# Patient Record
Sex: FEMALE | Race: WHITE | NOT HISPANIC OR LATINO | Employment: FULL TIME | URBAN - METROPOLITAN AREA
[De-identification: names, ages, dates, MRNs, and addresses within clinical notes are randomized per-mention and may not be internally consistent; named-entity substitution may affect disease eponyms.]

---

## 2018-08-09 DIAGNOSIS — K21.9 GASTROESOPHAGEAL REFLUX DISEASE WITHOUT ESOPHAGITIS: Primary | ICD-10-CM

## 2018-08-09 RX ORDER — OMEPRAZOLE 40 MG/1
CAPSULE, DELAYED RELEASE ORAL
Qty: 30 CAPSULE | Refills: 3 | Status: SHIPPED | OUTPATIENT
Start: 2018-08-09 | End: 2018-12-10

## 2018-08-09 RX ORDER — OMEPRAZOLE 40 MG/1
CAPSULE, DELAYED RELEASE ORAL
COMMUNITY
Start: 2018-04-18 | End: 2018-08-09 | Stop reason: SDUPTHER

## 2018-10-19 DIAGNOSIS — J30.89 NON-SEASONAL ALLERGIC RHINITIS, UNSPECIFIED TRIGGER: Primary | ICD-10-CM

## 2018-10-19 RX ORDER — FLUTICASONE PROPIONATE 50 MCG
SPRAY, SUSPENSION (ML) NASAL
Qty: 1 BOTTLE | Refills: 3 | Status: SHIPPED | OUTPATIENT
Start: 2018-10-19 | End: 2018-12-10

## 2018-12-10 ENCOUNTER — OFFICE VISIT (OUTPATIENT)
Dept: FAMILY MEDICINE CLINIC | Facility: CLINIC | Age: 28
End: 2018-12-10
Payer: COMMERCIAL

## 2018-12-10 VITALS
WEIGHT: 102 LBS | SYSTOLIC BLOOD PRESSURE: 140 MMHG | BODY MASS INDEX: 21.41 KG/M2 | HEIGHT: 58 IN | RESPIRATION RATE: 16 BRPM | DIASTOLIC BLOOD PRESSURE: 80 MMHG | HEART RATE: 117 BPM | OXYGEN SATURATION: 97 % | TEMPERATURE: 97.9 F

## 2018-12-10 DIAGNOSIS — R07.2 PRECORDIAL PAIN: ICD-10-CM

## 2018-12-10 DIAGNOSIS — K21.9 GERD WITHOUT ESOPHAGITIS: Primary | ICD-10-CM

## 2018-12-10 PROCEDURE — 3008F BODY MASS INDEX DOCD: CPT | Performed by: FAMILY MEDICINE

## 2018-12-10 PROCEDURE — 99214 OFFICE O/P EST MOD 30 MIN: CPT | Performed by: FAMILY MEDICINE

## 2018-12-10 PROCEDURE — 1036F TOBACCO NON-USER: CPT | Performed by: FAMILY MEDICINE

## 2018-12-10 RX ORDER — FAMOTIDINE 40 MG/5ML
20 POWDER, FOR SUSPENSION ORAL 2 TIMES DAILY
Qty: 50 ML | Refills: 0 | Status: SHIPPED | OUTPATIENT
Start: 2018-12-10 | End: 2018-12-19 | Stop reason: SDUPTHER

## 2018-12-10 RX ORDER — LORAZEPAM 0.5 MG/1
TABLET ORAL EVERY 24 HOURS
COMMUNITY
Start: 2018-01-05 | End: 2019-01-21 | Stop reason: SDUPTHER

## 2018-12-10 RX ORDER — NORETHINDRONE AND ETHINYL ESTRADIOL AND FERROUS FUMARATE 0.4-35(21)
1 KIT ORAL
COMMUNITY
Start: 2018-02-19

## 2018-12-10 RX ORDER — TRIAMCINOLONE ACETONIDE 5 MG/G
CREAM TOPICAL EVERY 12 HOURS
COMMUNITY
Start: 2018-01-05 | End: 2018-12-10

## 2018-12-10 RX ORDER — DICYCLOMINE HYDROCHLORIDE 10 MG/1
CAPSULE ORAL EVERY 12 HOURS
COMMUNITY
Start: 2018-05-09 | End: 2018-12-10

## 2018-12-10 NOTE — PROGRESS NOTES
Assessment/Plan:      Diagnoses and all orders for this visit:    GERD without esophagitis  Comments:  seems GERd , but its hurting too much, will do Pepcid for 2 weeks not better than GI specialist   Orders:  -     Ambulatory referral to Gastroenterology; Future  -     famotidine (PEPCID) 40 mg/5 mL suspension; Take 2 5 mL (20 mg total) by mouth 2 (two) times a day    Precordial pain  Comments:  she is consistent witht he precordial pain, will do EST to r/o any cardiac pathology    Other orders  -     Discontinue: dicyclomine (BENTYL) 10 mg capsule; Every 12 hours  -     LORazepam (ATIVAN) 0 5 mg tablet; every 24 hours  -     Discontinue: triamcinolone (KENALOG) 0 5 % cream; Every 12 hours  -     Norethin-Eth Estradiol-Fe 0 4-35 MG-MCG CHEW; Chew 1 tablet          Subjective:     Patient ID: Janie Oconnell is a 25 y o  female  HPI   Since few weeks has a pain in the chest area, persistent , non radiating, increase with the breathing, she is not taking her GERD medication because of its size, Also worried about the chest pain  Review of Systems   Constitutional: Negative  HENT: Negative  Eyes: Negative  Respiratory: Negative  Cardiovascular: Negative  Gastrointestinal: Negative  Genitourinary: Negative  Psychiatric/Behavioral: Negative  Objective:     Physical Exam   Constitutional: She is oriented to person, place, and time  She appears well-developed  HENT:   Head: Normocephalic  Mouth/Throat: Oropharynx is clear and moist    Neck: Normal range of motion  Cardiovascular: Normal rate and regular rhythm  Pulmonary/Chest: Effort normal and breath sounds normal    Abdominal: Soft  Bowel sounds are normal    Neurological: She is alert and oriented to person, place, and time  Skin: Skin is warm  Psychiatric: She has a normal mood and affect

## 2018-12-12 ENCOUNTER — TELEPHONE (OUTPATIENT)
Dept: FAMILY MEDICINE CLINIC | Facility: CLINIC | Age: 28
End: 2018-12-12

## 2018-12-12 NOTE — TELEPHONE ENCOUNTER
Pt scheduled for exercise stress test 12/24 at 8:00am at [facility]   She was given the directions (prepping) and also scheduling number in case she wants to reschedule

## 2018-12-19 ENCOUNTER — TELEPHONE (OUTPATIENT)
Dept: FAMILY MEDICINE CLINIC | Facility: CLINIC | Age: 28
End: 2018-12-19

## 2018-12-19 DIAGNOSIS — K21.9 GERD WITHOUT ESOPHAGITIS: ICD-10-CM

## 2018-12-19 RX ORDER — FAMOTIDINE 40 MG/5ML
20 POWDER, FOR SUSPENSION ORAL 2 TIMES DAILY
Qty: 50 ML | Refills: 0 | Status: SHIPPED | OUTPATIENT
Start: 2018-12-19 | End: 2019-01-07 | Stop reason: SDUPTHER

## 2018-12-19 NOTE — TELEPHONE ENCOUNTER
Pc from pt needs refill Pepcid liquid 40mg 2 5 ml 2 times day #30 days [facility]  Pt is out of med

## 2019-01-07 ENCOUNTER — HOSPITAL ENCOUNTER (OUTPATIENT)
Dept: NON INVASIVE DIAGNOSTICS | Facility: HOSPITAL | Age: 29
Discharge: HOME | End: 2019-01-07
Payer: COMMERCIAL

## 2019-01-07 ENCOUNTER — TELEPHONE (OUTPATIENT)
Dept: FAMILY MEDICINE CLINIC | Facility: CLINIC | Age: 29
End: 2019-01-07

## 2019-01-07 DIAGNOSIS — K21.9 GERD WITHOUT ESOPHAGITIS: ICD-10-CM

## 2019-01-07 DIAGNOSIS — R07.2 PRECORDIAL PAIN: ICD-10-CM

## 2019-01-07 LAB
CHEST PAIN STATEMENT: NORMAL
MAX DIASTOLIC BP: 78 MMHG
MAX HEART RATE: 179 BPM
MAX PREDICTED HEART RATE: 190 BPM
MAX. SYSTOLIC BP: 150 MMHG
PROTOCOL NAME: NORMAL
REASON FOR TERMINATION: NORMAL
TARGET HR FORMULA: NORMAL
TEST INDICATION: NORMAL
TIME IN EXERCISE PHASE: NORMAL

## 2019-01-07 PROCEDURE — 93016 CV STRESS TEST SUPVJ ONLY: CPT | Performed by: INTERNAL MEDICINE

## 2019-01-07 PROCEDURE — 93017 CV STRESS TEST TRACING ONLY: CPT

## 2019-01-07 PROCEDURE — 93018 CV STRESS TEST I&R ONLY: CPT | Performed by: INTERNAL MEDICINE

## 2019-01-07 RX ORDER — FAMOTIDINE 40 MG/5ML
20 POWDER, FOR SUSPENSION ORAL 2 TIMES DAILY
Qty: 50 ML | Refills: 1 | Status: SHIPPED | OUTPATIENT
Start: 2019-01-07 | End: 2019-01-21 | Stop reason: SDUPTHER

## 2019-01-07 NOTE — TELEPHONE ENCOUNTER
Pc from pt needs refill Pepcid 40mg/5ml Suspension 2 times a day [facility]  Pt is asking if she can have more than a 10 day supply

## 2019-01-18 ENCOUNTER — TELEPHONE (OUTPATIENT)
Dept: FAMILY MEDICINE CLINIC | Facility: CLINIC | Age: 29
End: 2019-01-18

## 2019-01-21 ENCOUNTER — OFFICE VISIT (OUTPATIENT)
Dept: FAMILY MEDICINE CLINIC | Facility: CLINIC | Age: 29
End: 2019-01-21
Payer: COMMERCIAL

## 2019-01-21 VITALS
WEIGHT: 100.8 LBS | HEART RATE: 129 BPM | HEIGHT: 59 IN | RESPIRATION RATE: 16 BRPM | TEMPERATURE: 98.3 F | DIASTOLIC BLOOD PRESSURE: 80 MMHG | BODY MASS INDEX: 20.32 KG/M2 | SYSTOLIC BLOOD PRESSURE: 120 MMHG | OXYGEN SATURATION: 98 %

## 2019-01-21 DIAGNOSIS — Z23 FLU VACCINE NEED: Primary | ICD-10-CM

## 2019-01-21 DIAGNOSIS — F41.9 ANXIETY: ICD-10-CM

## 2019-01-21 DIAGNOSIS — Z00.00 WELL ADULT EXAM: ICD-10-CM

## 2019-01-21 DIAGNOSIS — G93.0 BRAIN CYST: ICD-10-CM

## 2019-01-21 DIAGNOSIS — K21.9 GERD WITHOUT ESOPHAGITIS: ICD-10-CM

## 2019-01-21 PROCEDURE — 99395 PREV VISIT EST AGE 18-39: CPT | Performed by: NURSE PRACTITIONER

## 2019-01-21 RX ORDER — FAMOTIDINE 40 MG/5ML
20 POWDER, FOR SUSPENSION ORAL 2 TIMES DAILY
Qty: 150 ML | Refills: 3 | Status: SHIPPED | OUTPATIENT
Start: 2019-01-21 | End: 2019-05-18 | Stop reason: SDUPTHER

## 2019-01-21 RX ORDER — LORAZEPAM 0.5 MG/1
0.5 TABLET ORAL EVERY 24 HOURS
Qty: 25 TABLET | Refills: 0 | Status: SHIPPED | OUTPATIENT
Start: 2019-01-21 | End: 2020-05-14 | Stop reason: SDUPTHER

## 2019-01-21 NOTE — ASSESSMENT & PLAN NOTE
Healthy female  She has right sinus congestion without signs of a URI  Refused a flu shot  Does see the dentist and eye doctor  Discussed safety at home and at work, diet and exercise, fire arms and fire detectors  Proper body mechanics at work to prevent injury  F/U as needed

## 2019-01-21 NOTE — PROGRESS NOTES
Assessment/Plan:    Well adult exam  Healthy female  She has right sinus congestion without signs of a URI  Refused a flu shot  Does see the dentist and eye doctor  Discussed safety at home and at work, diet and exercise, fire arms and fire detectors  Proper body mechanics at work to prevent injury  F/U as needed  Brain cyst  Past CT recommended repeat CT  Will order brain CT without contrast          Diagnoses and all orders for this visit:    Flu vaccine need  Comments:  Refused    Orders:  -     SYRINGE/SINGLE-DOSE VIAL: influenza vaccine, 8060-7858, quadrivalent, 0 5 mL, preservative-free (AFLURIA, FLUARIX, FLULAVAL, FLUZONE)    Well adult exam    Brain cyst  -     CT head wo contrast; Future    GERD without esophagitis  Comments:  Pepcid is effective for symptom relief  Will con't with medication  Discussed low acid, no spicy diet and elevated the head of the bed  To return if it worsens  Orders:  -     famotidine (PEPCID) 40 mg/5 mL suspension; Take 2 5 mL (20 mg total) by mouth 2 (two) times a day    Anxiety  -     LORazepam (ATIVAN) 0 5 mg tablet; Take 1 tablet (0 5 mg total) by mouth every 24 hours          Subjective:      Patient ID: Carlita Taylor is a 25 y o  female here for annual physical     HPI  Not having any issue but would like a CT of the brain to f/u on a cyst   The following portions of the patient's history were reviewed and updated as appropriate:   She  has a past medical history of Anxiety; GERD (gastroesophageal reflux disease); and IUD (intrauterine device) in place  She   Patient Active Problem List    Diagnosis Date Noted    Well adult exam 01/21/2019    Brain cyst 01/21/2019    Flu vaccine need 01/21/2019    Anxiety 01/21/2019     She  has no past surgical history on file  Her family history includes Diabetes in her father, paternal grandfather, and paternal grandmother; Hypertension in her mother; Rectal cancer in her paternal grandmother    She  reports that she has never smoked  She has never used smokeless tobacco  She reports that she drinks alcohol  She reports that she does not use drugs  Current Outpatient Prescriptions   Medication Sig Dispense Refill    famotidine (PEPCID) 40 mg/5 mL suspension Take 2 5 mL (20 mg total) by mouth 2 (two) times a day 150 mL 3    LORazepam (ATIVAN) 0 5 mg tablet Take 1 tablet (0 5 mg total) by mouth every 24 hours 25 tablet 0    Norethin-Eth Estradiol-Fe 0 4-35 MG-MCG CHEW Chew 1 tablet       No current facility-administered medications for this visit  Current Outpatient Prescriptions on File Prior to Visit   Medication Sig    Norethin-Eth Estradiol-Fe 0 4-35 MG-MCG CHEW Chew 1 tablet    [DISCONTINUED] famotidine (PEPCID) 40 mg/5 mL suspension Take 2 5 mL (20 mg total) by mouth 2 (two) times a day    [DISCONTINUED] LORazepam (ATIVAN) 0 5 mg tablet every 24 hours     No current facility-administered medications on file prior to visit  She is allergic to albumen, egg and prednisone       Review of Systems   Constitutional: Negative  HENT: Positive for congestion, postnasal drip and sinus pressure  Eyes: Negative  Respiratory: Negative  Cardiovascular: Negative  Gastrointestinal: Negative  Endocrine: Negative  Genitourinary: Negative  Musculoskeletal: Negative  Skin: Negative  Allergic/Immunologic: Positive for food allergies  Neurological: Positive for headaches (with her periods)  Hematological: Negative  Psychiatric/Behavioral: The patient is nervous/anxious (Take lozapam once "in a blue")  Objective:      /80   Pulse (!) 129   Temp 98 3 °F (36 8 °C) (Tympanic)   Resp 16   Ht 4' 11" (1 499 m)   Wt 45 7 kg (100 lb 12 8 oz)   SpO2 98%   BMI 20 36 kg/m²          Physical Exam   Constitutional: She is oriented to person, place, and time  She appears well-developed and well-nourished  HENT:   Head: Normocephalic     Right Ear: External ear normal    Left Ear: External ear normal    Nose: Nose normal    Mouth/Throat: Oropharynx is clear and moist    Eyes: Pupils are equal, round, and reactive to light  Conjunctivae and EOM are normal    Neck: Normal range of motion  Neck supple  No thyromegaly present  Cardiovascular: Normal rate, regular rhythm, normal heart sounds and intact distal pulses  Pulmonary/Chest: Effort normal and breath sounds normal    Abdominal: Soft  Bowel sounds are normal    Musculoskeletal: Normal range of motion  She exhibits tenderness (right lateral thoracic area  Muscular tenderness  )  Lymphadenopathy:     She has no cervical adenopathy  Neurological: She is alert and oriented to person, place, and time  She has normal reflexes  Skin: Skin is warm and dry  Psychiatric: She has a normal mood and affect   Her behavior is normal

## 2019-02-28 ENCOUNTER — TELEPHONE (OUTPATIENT)
Dept: FAMILY MEDICINE CLINIC | Facility: CLINIC | Age: 29
End: 2019-02-28

## 2019-02-28 NOTE — TELEPHONE ENCOUNTER
Per Chris Laughlin at [facility] prior auth dept, CT head w/o contrast approved   auth#[id number] valid 03/04/2019-04/02/2019

## 2019-03-18 ENCOUNTER — HOSPITAL ENCOUNTER (OUTPATIENT)
Dept: CT IMAGING | Facility: HOSPITAL | Age: 29
Discharge: HOME | End: 2019-03-18
Payer: COMMERCIAL

## 2019-03-18 DIAGNOSIS — G93.0 BRAIN CYST: ICD-10-CM

## 2019-03-18 PROCEDURE — 70450 CT HEAD/BRAIN W/O DYE: CPT

## 2019-03-26 ENCOUNTER — TELEPHONE (OUTPATIENT)
Dept: FAMILY MEDICINE CLINIC | Facility: CLINIC | Age: 29
End: 2019-03-26

## 2019-03-26 NOTE — TELEPHONE ENCOUNTER
Pt informed normal CT  She states years ago she had a cyst on her brain  She was wondering if it was still there

## 2019-05-18 DIAGNOSIS — K21.9 GERD WITHOUT ESOPHAGITIS: ICD-10-CM

## 2019-05-20 DIAGNOSIS — K21.9 GERD WITHOUT ESOPHAGITIS: ICD-10-CM

## 2019-05-20 RX ORDER — FAMOTIDINE 40 MG/5ML
POWDER, FOR SUSPENSION ORAL
Qty: 150 ML | Refills: 3 | Status: SHIPPED | OUTPATIENT
Start: 2019-05-20 | End: 2019-05-20 | Stop reason: SDUPTHER

## 2019-05-20 RX ORDER — FAMOTIDINE 40 MG/5ML
10 POWDER, FOR SUSPENSION ORAL 2 TIMES DAILY
Qty: 150 ML | Refills: 3 | Status: SHIPPED | OUTPATIENT
Start: 2019-05-20 | End: 2019-08-19 | Stop reason: SDUPTHER

## 2019-08-19 ENCOUNTER — TELEPHONE (OUTPATIENT)
Dept: FAMILY MEDICINE CLINIC | Facility: CLINIC | Age: 29
End: 2019-08-19

## 2019-08-19 DIAGNOSIS — K21.9 GERD WITHOUT ESOPHAGITIS: ICD-10-CM

## 2019-08-20 RX ORDER — FAMOTIDINE 40 MG/5ML
10 POWDER, FOR SUSPENSION ORAL 2 TIMES DAILY
Qty: 150 ML | Refills: 3 | Status: SHIPPED | OUTPATIENT
Start: 2019-08-20 | End: 2019-09-17 | Stop reason: SDUPTHER

## 2019-09-17 DIAGNOSIS — K21.9 GERD WITHOUT ESOPHAGITIS: ICD-10-CM

## 2019-09-17 RX ORDER — FAMOTIDINE 40 MG/5ML
10 POWDER, FOR SUSPENSION ORAL 2 TIMES DAILY
Qty: 150 ML | Refills: 3 | Status: SHIPPED | OUTPATIENT
Start: 2019-09-17 | End: 2019-09-18 | Stop reason: SDUPTHER

## 2019-09-18 ENCOUNTER — TELEPHONE (OUTPATIENT)
Dept: FAMILY MEDICINE CLINIC | Facility: CLINIC | Age: 29
End: 2019-09-18

## 2019-09-18 DIAGNOSIS — K21.9 GERD WITHOUT ESOPHAGITIS: ICD-10-CM

## 2019-09-18 RX ORDER — FAMOTIDINE 40 MG/5ML
20 POWDER, FOR SUSPENSION ORAL 2 TIMES DAILY
Qty: 150 ML | Refills: 3 | Status: SHIPPED | OUTPATIENT
Start: 2019-09-18 | End: 2019-11-11

## 2019-09-19 NOTE — TELEPHONE ENCOUNTER
I called and left a message about her medication that was sent to the pharmacy    Pt needs a f/u appt

## 2019-11-07 RX ORDER — FLUTICASONE PROPIONATE 50 MCG
SPRAY, SUSPENSION (ML) NASAL
Refills: 3 | COMMUNITY
Start: 2019-07-31 | End: 2020-05-21 | Stop reason: SDUPTHER

## 2019-11-11 ENCOUNTER — OFFICE VISIT (OUTPATIENT)
Dept: FAMILY MEDICINE CLINIC | Facility: CLINIC | Age: 29
End: 2019-11-11
Payer: COMMERCIAL

## 2019-11-11 VITALS
RESPIRATION RATE: 16 BRPM | HEART RATE: 95 BPM | TEMPERATURE: 99.2 F | HEIGHT: 59 IN | BODY MASS INDEX: 21.49 KG/M2 | SYSTOLIC BLOOD PRESSURE: 118 MMHG | DIASTOLIC BLOOD PRESSURE: 78 MMHG | WEIGHT: 106.6 LBS | OXYGEN SATURATION: 98 %

## 2019-11-11 DIAGNOSIS — K21.9 GASTROESOPHAGEAL REFLUX DISEASE, ESOPHAGITIS PRESENCE NOT SPECIFIED: Primary | ICD-10-CM

## 2019-11-11 PROCEDURE — 1036F TOBACCO NON-USER: CPT | Performed by: PHYSICIAN ASSISTANT

## 2019-11-11 PROCEDURE — 99213 OFFICE O/P EST LOW 20 MIN: CPT | Performed by: PHYSICIAN ASSISTANT

## 2019-11-11 RX ORDER — FAMOTIDINE 20 MG/1
20 TABLET, FILM COATED ORAL 2 TIMES DAILY
Qty: 180 TABLET | Refills: 1 | Status: SHIPPED | OUTPATIENT
Start: 2019-11-11 | End: 2020-01-27 | Stop reason: ALTCHOICE

## 2019-11-11 RX ORDER — FAMOTIDINE 40 MG/5ML
20 POWDER, FOR SUSPENSION ORAL 2 TIMES DAILY
COMMUNITY
End: 2020-01-27 | Stop reason: SDUPTHER

## 2019-11-11 NOTE — PROGRESS NOTES
Assessment/Plan:    -I did explain to her that the Famotidine tablet is small  She will try the 20 mg tablet instead of the solution 20 mg twice daily  I did advise her that she could try reducing it down to once daily  Increase the medication if needed if symptoms do return  -I did recommend that she avoid any irritating foods such as caffeine, spicy foods, acidic foods, citrus, large meals at least 2-3 hours her to bed  -recommend follow-up with any increasing symptoms otherwise routine follow-up in 5-6 months  [employer] software was used to dictate this note  It may contain errors with dictating incorrect words/spelling  Please contact provider directly for any questions  Diagnoses and all orders for this visit:    Gastroesophageal reflux disease, esophagitis presence not specified  -     famotidine (PEPCID) 20 mg tablet; Take 1 tablet (20 mg total) by mouth 2 (two) times a day    Other orders  -     fluticasone (FLONASE) 50 mcg/act nasal spray; SPRAY 2 SPRAYS INTO EACH NOSTRIL EVERY DAY  -     famotidine (PEPCID) 40 mg/5 mL suspension; Take 20 mg by mouth 2 (two) times a day          Subjective:      Patient ID: Sally Castro is a 30 y o  female  Patient presents today for follow-up of reflux  She states that she infrequently has symptoms  She is currently on the liquid form of Famotidine and she takes 2 5 mL which would be 20 mg twice daily  She states that she has not tried reducing the medication to once a day  She states that she did have a scope years ago which revealed of reflux  Initially she was placed on AcipHex but was able to be changed to Famotidine instead  She does have difficulty swallowing pills  She cannot remember the last episode of reflux  She denies any nausea, vomiting or changes in her bowels including blood        The following portions of the patient's history were reviewed and updated as appropriate:   She  has a past medical history of Anxiety, GERD (gastroesophageal reflux disease), and IUD (intrauterine device) in place  She   Patient Active Problem List    Diagnosis Date Noted    Well adult exam 01/21/2019    Brain cyst 01/21/2019    Flu vaccine need 01/21/2019    Anxiety 01/21/2019    Pap smear abnormality of cervix with LGSIL 12/28/2015    Gastroesophageal reflux disease 01/28/2011    Allergic rhinitis 06/09/1999     She  has no past surgical history on file  Her family history includes Diabetes in her father, paternal grandfather, and paternal grandmother; Hypertension in her mother; Rectal cancer in her paternal grandmother  She  reports that she has never smoked  She has never used smokeless tobacco  She reports that she drinks alcohol  She reports that she does not use drugs  Current Outpatient Medications   Medication Sig Dispense Refill    famotidine (PEPCID) 40 mg/5 mL suspension Take 20 mg by mouth 2 (two) times a day      fluticasone (FLONASE) 50 mcg/act nasal spray SPRAY 2 SPRAYS INTO EACH NOSTRIL EVERY DAY  3    LORazepam (ATIVAN) 0 5 mg tablet Take 1 tablet (0 5 mg total) by mouth every 24 hours 25 tablet 0    Norethin-Eth Estradiol-Fe 0 4-35 MG-MCG CHEW Chew 1 tablet      famotidine (PEPCID) 20 mg tablet Take 1 tablet (20 mg total) by mouth 2 (two) times a day 180 tablet 1     No current facility-administered medications for this visit  Current Outpatient Medications on File Prior to Visit   Medication Sig    famotidine (PEPCID) 40 mg/5 mL suspension Take 20 mg by mouth 2 (two) times a day    fluticasone (FLONASE) 50 mcg/act nasal spray SPRAY 2 SPRAYS INTO EACH NOSTRIL EVERY DAY    LORazepam (ATIVAN) 0 5 mg tablet Take 1 tablet (0 5 mg total) by mouth every 24 hours    Norethin-Eth Estradiol-Fe 0 4-35 MG-MCG CHEW Chew 1 tablet    [DISCONTINUED] famotidine (PEPCID) 40 mg/5 mL suspension Take 2 5 mL (20 mg total) by mouth 2 (two) times a day     No current facility-administered medications on file prior to visit  She is allergic to albumen, egg and prednisone       Review of Systems   Constitutional: Negative  Gastrointestinal:        As stated in HPI         Objective:      /78 (BP Location: Left arm, Patient Position: Sitting, Cuff Size: Standard)   Pulse 95   Temp 99 2 °F (37 3 °C) (Tympanic)   Resp 16   Ht 4' 11" (1 499 m)   Wt 48 4 kg (106 lb 9 6 oz)   SpO2 98%   BMI 21 53 kg/m²          Physical Exam   Constitutional: She appears well-developed and well-nourished  No distress  HENT:   Head: Normocephalic and atraumatic  Right Ear: External ear normal    Left Ear: External ear normal    Mouth/Throat: Oropharynx is clear and moist  No oropharyngeal exudate  Neck: Neck supple  Cardiovascular: Normal rate, regular rhythm and normal heart sounds  No murmur heard  Pulmonary/Chest: Effort normal and breath sounds normal  No respiratory distress  She has no wheezes  She has no rales  Abdominal: Soft  Bowel sounds are normal  There is no tenderness  Lymphadenopathy:     She has no cervical adenopathy  Neurological: She is alert  Skin: Skin is warm  Psychiatric: She has a normal mood and affect

## 2020-01-10 ENCOUNTER — TELEPHONE (OUTPATIENT)
Dept: FAMILY MEDICINE CLINIC | Facility: CLINIC | Age: 30
End: 2020-01-10

## 2020-01-10 DIAGNOSIS — H10.30 ACUTE CONJUNCTIVITIS, UNSPECIFIED ACUTE CONJUNCTIVITIS TYPE, UNSPECIFIED LATERALITY: Primary | ICD-10-CM

## 2020-01-10 RX ORDER — OFLOXACIN 3 MG/ML
1 SOLUTION/ DROPS OPHTHALMIC 4 TIMES DAILY
Qty: 5 ML | Refills: 0 | Status: SHIPPED | OUTPATIENT
Start: 2020-01-10 | End: 2020-05-21

## 2020-01-10 NOTE — TELEPHONE ENCOUNTER
pt's mother Aria Cooney states pt has pink eye and is requesting eye drops  Discussed with dr Devi Walden, will send occuflox to pharmacy

## 2020-01-10 NOTE — TELEPHONE ENCOUNTER
Pt mom called in that patient has pink eye    Please call in drops to [facility], ok per Dr Shiraz Burgess

## 2020-01-27 DIAGNOSIS — K21.9 GASTROESOPHAGEAL REFLUX DISEASE, ESOPHAGITIS PRESENCE NOT SPECIFIED: Primary | ICD-10-CM

## 2020-01-27 RX ORDER — FAMOTIDINE 40 MG/5ML
20 POWDER, FOR SUSPENSION ORAL 2 TIMES DAILY
Qty: 50 ML | Refills: 1 | Status: SHIPPED | OUTPATIENT
Start: 2020-01-27 | End: 2020-01-28 | Stop reason: SDUPTHER

## 2020-01-28 ENCOUNTER — TELEPHONE (OUTPATIENT)
Dept: FAMILY MEDICINE CLINIC | Facility: CLINIC | Age: 30
End: 2020-01-28

## 2020-01-28 DIAGNOSIS — K21.9 GASTROESOPHAGEAL REFLUX DISEASE, ESOPHAGITIS PRESENCE NOT SPECIFIED: ICD-10-CM

## 2020-01-28 RX ORDER — FAMOTIDINE 40 MG/5ML
20 POWDER, FOR SUSPENSION ORAL 2 TIMES DAILY
Qty: 150 ML | Refills: 1 | Status: SHIPPED | OUTPATIENT
Start: 2020-01-28 | End: 2021-08-30 | Stop reason: SDUPTHER

## 2020-01-28 NOTE — TELEPHONE ENCOUNTER
Please let her know the prescription was sent again to the pharmacy with the 150 mL    Please let her know that we apologize for the mistake and in the future we will be sure to gather more information about the prescription at the time of the phone call

## 2020-01-28 NOTE — TELEPHONE ENCOUNTER
Patient stopped in office stating that we sent the wrong script    She said every time she requests a refill something is done wrong    It was ordered for 50ml and she usually gets 150ml

## 2020-01-28 NOTE — TELEPHONE ENCOUNTER
Patient called today because she states that the prescription for the suspension of the Famotidine should have been 150 mL, not 50 mL  I approved the prescription yesterday that was sent by [facility] for the 50ml  There were no specific instructions in the message indicating that the patient wanted 150 mL     The prescription was resent today

## 2020-03-26 DIAGNOSIS — K21.9 GASTROESOPHAGEAL REFLUX DISEASE, ESOPHAGITIS PRESENCE NOT SPECIFIED: ICD-10-CM

## 2020-03-26 RX ORDER — FAMOTIDINE 40 MG/5ML
20 POWDER, FOR SUSPENSION ORAL 2 TIMES DAILY
Qty: 150 ML | Refills: 1 | OUTPATIENT
Start: 2020-03-26

## 2020-05-14 DIAGNOSIS — F41.9 ANXIETY: ICD-10-CM

## 2020-05-15 RX ORDER — LORAZEPAM 0.5 MG/1
0.5 TABLET ORAL EVERY 24 HOURS
Qty: 25 TABLET | Refills: 0 | Status: SHIPPED | OUTPATIENT
Start: 2020-05-15 | End: 2021-03-15 | Stop reason: SDUPTHER

## 2020-05-21 ENCOUNTER — TELEMEDICINE (OUTPATIENT)
Dept: FAMILY MEDICINE CLINIC | Facility: CLINIC | Age: 30
End: 2020-05-21
Payer: COMMERCIAL

## 2020-05-21 VITALS — HEIGHT: 59 IN | WEIGHT: 98 LBS | BODY MASS INDEX: 19.76 KG/M2

## 2020-05-21 DIAGNOSIS — J30.1 SEASONAL ALLERGIC RHINITIS DUE TO POLLEN: ICD-10-CM

## 2020-05-21 DIAGNOSIS — F41.9 ANXIETY: ICD-10-CM

## 2020-05-21 DIAGNOSIS — F43.21 SITUATIONAL DEPRESSION: ICD-10-CM

## 2020-05-21 DIAGNOSIS — K21.9 GASTROESOPHAGEAL REFLUX DISEASE, ESOPHAGITIS PRESENCE NOT SPECIFIED: Primary | ICD-10-CM

## 2020-05-21 PROCEDURE — 3008F BODY MASS INDEX DOCD: CPT | Performed by: PHYSICIAN ASSISTANT

## 2020-05-21 PROCEDURE — 99214 OFFICE O/P EST MOD 30 MIN: CPT | Performed by: PHYSICIAN ASSISTANT

## 2020-05-21 RX ORDER — FLUTICASONE PROPIONATE 50 MCG
1 SPRAY, SUSPENSION (ML) NASAL DAILY
Qty: 1 BOTTLE | Refills: 3 | Status: SHIPPED | OUTPATIENT
Start: 2020-05-21 | End: 2021-06-28

## 2020-05-21 RX ORDER — CETIRIZINE HYDROCHLORIDE 10 MG/1
10 TABLET ORAL DAILY
COMMUNITY

## 2020-06-10 LAB
EXTERNAL HIV CONFIRMATION: NORMAL
EXTERNAL HIV SCREEN: NORMAL
HBA1C MFR BLD HPLC: 5 %
HCV AB SER-ACNC: NEGATIVE

## 2020-09-16 PROBLEM — Z11.3 SCREENING FOR VENEREAL DISEASE: Status: ACTIVE | Noted: 2020-05-28

## 2020-09-16 PROBLEM — Z01.411 ENCOUNTER FOR GYNECOLOGICAL EXAMINATION WITH ABNORMAL FINDING: Status: ACTIVE | Noted: 2020-05-28

## 2021-01-05 ENCOUNTER — OFFICE VISIT (OUTPATIENT)
Dept: URGENT CARE | Age: 31
End: 2021-01-05
Payer: COMMERCIAL

## 2021-01-05 ENCOUNTER — TELEPHONE (OUTPATIENT)
Dept: FAMILY MEDICINE CLINIC | Facility: CLINIC | Age: 31
End: 2021-01-05

## 2021-01-05 VITALS
HEIGHT: 58 IN | HEART RATE: 112 BPM | TEMPERATURE: 99.4 F | WEIGHT: 100 LBS | OXYGEN SATURATION: 98 % | BODY MASS INDEX: 20.99 KG/M2

## 2021-01-05 DIAGNOSIS — R05.9 COUGH: Primary | ICD-10-CM

## 2021-01-05 PROCEDURE — 99213 OFFICE O/P EST LOW 20 MIN: CPT | Performed by: PHYSICIAN ASSISTANT

## 2021-01-05 PROCEDURE — U0003 INFECTIOUS AGENT DETECTION BY NUCLEIC ACID (DNA OR RNA); SEVERE ACUTE RESPIRATORY SYNDROME CORONAVIRUS 2 (SARS-COV-2) (CORONAVIRUS DISEASE [COVID-19]), AMPLIFIED PROBE TECHNIQUE, MAKING USE OF HIGH THROUGHPUT TECHNOLOGIES AS DESCRIBED BY CMS-2020-01-R: HCPCS | Performed by: PHYSICIAN ASSISTANT

## 2021-01-05 NOTE — LETTER
January 5, 2021     Patient: Radha Olmedo   YOB: 1990   Date of Visit: 1/5/2021       To Whom It May Concern: It is my medical opinion that Radha Olmedo should remain out of work until cleared by physician  If you have any questions or concerns, please don't hesitate to call           Sincerely,        MEREDITH KIM    CC: No Recipients

## 2021-01-06 DIAGNOSIS — U07.1 COVID-19: Primary | ICD-10-CM

## 2021-01-06 LAB — SARS-COV-2 RNA RESP QL NAA+PROBE: NEGATIVE

## 2021-01-06 NOTE — PATIENT INSTRUCTIONS
Patient Instructions   COVID testing initiated  Results may take up to 5-10 days to return, but often come back sooner (2-4 days)     If the patient has a [facility] My Chart account, results may be accessed on line  If the patient does not have the [employer] Chart account, please establish one so results can be accessed  This will be the easiest and quickest way to get a copy of your test results if you require printed documentation  If patient is symptomatic and until results are obtained, home quarantine / self isolation strongly encouraged  If testing is done for screening purposes and patient is not symptomatic, we still recommend masking, social distancing, good hygiene practices be followed  If COVID test is positive, patient / care giver will be contacted by ordering provider or designated staff  If COVID test is positive, please call the primary care provider office to inform of positive test and request follow up evaluation appointment  (Generally, primary care providers are doing telemedicine visits with their positive COVID patients )  If COVID test is positive, please again review all information below  Further questions may be addressed by the primary care provider or the [address] [place] [address] [place] at [phone]  If the patient / caregiver has not heard about test results or has been unable to access results on the patient My Chart account in a timely fashion, please call the provider's office where test was ordered (or Hot Line if applicable)  to inquire about results  If results are negative and patient / care giver has been found to have already accessed results through the [facility], no call will be made  Until results are obtained, home quarantine / self isolation strongly encouraged       If the patient would develop profound weakness, chest pain, shortness of breath please proceed to an emergency room for further evaluation otherwise we do recommend that patient follow-up with their primary care provider in the next 5-7 days if not improving  Symptomatic treatment as needed for symptoms relief based on age / medical status of patient  Things like warm salt water gargles, Tylenol or Ibuprofen (if not contraindicated), drinking plenty of fluids, nasal saline rinses / spray, warm tea with honey (not for patients less than 1 year of age),  etc may provide symptoms relief  [address]     Your healthcare provider and/or public health staff have evaluated you and have determined that you do not need to remain in the hospital at this time  At this time you can be isolated at home where you will be monitored by staff from your local or state health department  You should carefully follow the prevention and isolation steps below until a healthcare provider or local or state health department says that you can return to your normal activities  Stay home except to get medical care     People who are mildly ill with COVID-19 are able to isolate at home during their illness  You should restrict activities outside your home, except for getting medical care  Do not go to work, school, or public areas  Avoid using public transportation, ride-sharing, or taxis  Separate yourself from other people and animals in your home     People: As much as possible, you should stay in a specific room and away from other people in your home  Also, you should use a separate bathroom, if available  Animals: You should restrict contact with pets and other animals while you are sick with COVID-19, just like you would around other people  Although there have not been reports of pets or other animals becoming sick with COVID-19, it is still recommended that people sick with COVID-19 limit contact with animals until more information is known about the virus   When possible, have another member of your household care for your animals while you are sick  If you are sick with COVID-19, avoid contact with your pet, including petting, snuggling, being kissed or licked, and sharing food  If you must care for your pet or be around animals while you are sick, wash your hands before and after you interact with pets and wear a facemask  See COVID-19 and Animals for more information  Call ahead before visiting your doctor     If you have a medical appointment, call the healthcare provider and tell them that you have or may have COVID-19  This will help the healthcare providers office take steps to keep other people from getting infected or exposed  Wear a facemask     You should wear a facemask when you are around other people (e g , sharing a room or vehicle) or pets and before you enter a healthcare providers office  If you are not able to wear a facemask (for example, because it causes trouble breathing), then people who live with you should not stay in the same room with you, or they should wear a facemask if they enter your room  Cover your coughs and sneezes     Cover your mouth and nose with a tissue when you cough or sneeze  Throw used tissues in a lined trash can  Immediately wash your hands with soap and water for at least 20 seconds or, if soap and water are not available, clean your hands with an alcohol-based hand  that contains at least 60% alcohol  Clean your hands often     Wash your hands often with soap and water for at least 20 seconds, especially after blowing your nose, coughing, or sneezing; going to the bathroom; and before eating or preparing food  If soap and water are not readily available, use an alcohol-based hand  with at least 60% alcohol, covering all surfaces of your hands and rubbing them together until they feel dry  Soap and water are the best option if hands are visibly dirty  Avoid touching your eyes, nose, and mouth with unwashed hands       Avoid sharing personal household items     You should not share dishes, drinking glasses, cups, eating utensils, towels, or bedding with other people or pets in your home  After using these items, they should be washed thoroughly with soap and water  Clean all high-touch surfaces everyday     High touch surfaces include counters, tabletops, doorknobs, bathroom fixtures, toilets, phones, keyboards, tablets, and bedside tables  Also, clean any surfaces that may have blood, stool, or body fluids on them  Use a household cleaning spray or wipe, according to the label instructions  Labels contain instructions for safe and effective use of the cleaning product including precautions you should take when applying the product, such as wearing gloves and making sure you have good ventilation during use of the product  Monitor your symptoms     Seek prompt medical attention if your illness is worsening (e g , difficulty breathing)  Before seeking care, call your healthcare provider and tell them that you have, or are being evaluated for, COVID-19  Put on a facemask before you enter the facility  These steps will help the healthcare providers office to keep other people in the office or waiting room from getting infected or exposed  Ask your healthcare provider to call the local or [facility] health department  Persons who are placed under active monitoring or facilitated self-monitoring should follow instructions provided by their local health department or occupational health professionals, as appropriate  If you have a medical emergency and need to call 911, notify the dispatch personnel that you have, or are being evaluated for COVID-19  If possible, put on a facemask before emergency medical services arrive       Discontinuing home isolation     Patients with confirmed COVID-19 should remain under home isolation precautions until the following conditions are met:   § They have had no fever for at least 24 hours (that is one full day of no fever without the use medicine that reduces fevers)  AND  § other symptoms have improved (for example, when their cough or shortness of breath have improved)  AND  § at least 10 days have passed since their symptoms first appeared     Patients with confirmed COVID-19 should also notify close contacts (including their workplace) and ask that they self-quarantine  Currently, close contact is defined as being within 6 feet for for a cumulative total of 15 minutes or more over a 24 hour period starting from 2 days before illness onset  (or, for asymptomatic patients, 2 days prior to test specimen collection)  Close contacts of patients diagnosed with COVID-19 should be instructed by the patient to self-quarantine for 14 days from the last time of their last contact with the patient        Source: [employer]

## 2021-01-06 NOTE — PROGRESS NOTES
[facility]        NAME: Kristopher Ramesh is a 26 y o  female  : 1990    MRN: [id number]  DATE: 2021  TIME: 7:37 PM    Assessment and Plan   No primary diagnosis found  No diagnosis found  Patient Instructions       Follow up with PCP in 3-5 days  Proceed to  ER if symptoms worsen  Chief Complaint     Chief Complaint   Patient presents with    Cold Like Symptoms     Pt exposed to parents who tested positive for Covid-19 today and pt is c/o body aches, nasal congestion, runny nose, mild cough x two days  No previous testing  History of Present Illness       Cough  This is a new problem  Episode onset: Two days ago  The problem has been gradually worsening  The problem occurs every few minutes  The cough is non-productive  Associated symptoms include chills, myalgias, nasal congestion, postnasal drip and a sore throat  Pertinent negatives include no chest pain, ear pain, fever, headaches, rash, rhinorrhea, shortness of breath or wheezing  Nothing aggravates the symptoms  She has tried nothing for the symptoms  The treatment provided no relief     mom and dad currently have coronavirus and her headed to the emergency room because they have coronavirus pneumonia  Review of Systems   Review of Systems   Constitutional: Positive for chills  Negative for diaphoresis, fatigue and fever  HENT: Positive for postnasal drip, sinus pressure, sinus pain, sneezing and sore throat  Negative for congestion, ear pain, rhinorrhea and voice change  Eyes: Negative  Respiratory: Positive for cough  Negative for chest tightness, shortness of breath and wheezing  Cardiovascular: Negative for chest pain and palpitations  Gastrointestinal: Negative for abdominal pain, constipation, diarrhea, nausea and vomiting  Endocrine: Negative  Genitourinary: Negative for dysuria  Musculoskeletal: Positive for myalgias  Negative for back pain and neck pain     Skin: Negative for pallor and rash  Allergic/Immunologic: Negative  Neurological: Negative for dizziness, syncope and headaches  Hematological: Negative  Psychiatric/Behavioral: Negative  Current Medications       Current Outpatient Medications:     famotidine (PEPCID) 40 mg/5 mL suspension, Take 2 5 mL (20 mg total) by mouth 2 (two) times a day, Disp: 150 mL, Rfl: 1    LORazepam (ATIVAN) 0 5 mg tablet, Take 1 tablet (0 5 mg total) by mouth every 24 hours, Disp: 25 tablet, Rfl: 0    Norethin-Eth Estradiol-Fe 0 4-35 MG-MCG CHEW, Chew 1 tablet, Disp: , Rfl:     cetirizine (ZyrTEC) 10 mg tablet, Take 10 mg by mouth daily, Disp: , Rfl:     fluticasone (FLONASE) 50 mcg/act nasal spray, 1 spray into each nostril daily (Patient not taking: Reported on 1/5/2021), Disp: 1 Bottle, Rfl: 3    nystatin-triamcinolone (MYCOLOG-II) cream, APPLY TOPICALLY 4 TIMES A DAY , Disp: , Rfl:     Current Allergies     Allergies as of 01/05/2021 - Reviewed 01/05/2021   Allergen Reaction Noted    Albumen, egg  02/19/2018    Prednisone  06/01/2018            The following portions of the patient's history were reviewed and updated as appropriate: allergies, current medications, past family history, past medical history, past social history, past surgical history and problem list      Past Medical History:   Diagnosis Date    Anxiety     GERD (gastroesophageal reflux disease)     IUD (intrauterine device) in place        History reviewed  No pertinent surgical history  Family History   Problem Relation Age of Onset    Hypertension Mother     Diabetes Father     Diabetes Paternal Grandmother     Rectal cancer Paternal Grandmother     Diabetes Paternal Grandfather          Medications have been verified  Objective   Ht 4' 10" (1 473 m)   Wt 45 4 kg (100 lb)   LMP 12/05/2020 (Approximate)   BMI 20 90 kg/m²        Physical Exam     Physical Exam  Vitals signs and nursing note reviewed     Constitutional:       General: She is not in acute distress  Appearance: Normal appearance  She is well-developed  She is not ill-appearing or diaphoretic  HENT:      Head: Normocephalic and atraumatic  Right Ear: Tympanic membrane, ear canal and external ear normal       Left Ear: Tympanic membrane, ear canal and external ear normal       Nose: Congestion present  No rhinorrhea  Mouth/Throat:      Pharynx: Posterior oropharyngeal erythema present  No oropharyngeal exudate  Eyes:      General:         Right eye: No discharge  Left eye: No discharge  Conjunctiva/sclera: Conjunctivae normal    Neck:      Musculoskeletal: Normal range of motion and neck supple  Cardiovascular:      Rate and Rhythm: Normal rate and regular rhythm  Heart sounds: Normal heart sounds  Pulmonary:      Effort: Pulmonary effort is normal  No respiratory distress  Breath sounds: Normal breath sounds  No wheezing, rhonchi or rales  Lymphadenopathy:      Cervical: No cervical adenopathy  Skin:     General: Skin is warm  Capillary Refill: Capillary refill takes less than 2 seconds  Findings: No rash  Neurological:      Mental Status: She is alert

## 2021-03-09 PROBLEM — N76.0 BV (BACTERIAL VAGINOSIS): Status: ACTIVE | Noted: 2020-12-10

## 2021-03-09 PROBLEM — E78.1 PRIMARY HYPERTRIGLYCERIDEMIA: Status: ACTIVE | Noted: 2021-03-09

## 2021-03-09 PROBLEM — B96.89 BV (BACTERIAL VAGINOSIS): Status: ACTIVE | Noted: 2020-12-10

## 2021-03-15 ENCOUNTER — OFFICE VISIT (OUTPATIENT)
Dept: FAMILY MEDICINE CLINIC | Facility: CLINIC | Age: 31
End: 2021-03-15
Payer: COMMERCIAL

## 2021-03-15 VITALS
TEMPERATURE: 98.2 F | DIASTOLIC BLOOD PRESSURE: 80 MMHG | WEIGHT: 100.6 LBS | RESPIRATION RATE: 16 BRPM | SYSTOLIC BLOOD PRESSURE: 128 MMHG | OXYGEN SATURATION: 97 % | HEIGHT: 58 IN | HEART RATE: 116 BPM | BODY MASS INDEX: 21.12 KG/M2

## 2021-03-15 DIAGNOSIS — E78.1 PRIMARY HYPERTRIGLYCERIDEMIA: ICD-10-CM

## 2021-03-15 DIAGNOSIS — R00.2 PALPITATIONS: ICD-10-CM

## 2021-03-15 DIAGNOSIS — F41.9 ANXIETY: Primary | ICD-10-CM

## 2021-03-15 DIAGNOSIS — F43.21 SITUATIONAL DEPRESSION: ICD-10-CM

## 2021-03-15 DIAGNOSIS — R05.8 POST-VIRAL COUGH SYNDROME: ICD-10-CM

## 2021-03-15 PROCEDURE — 3008F BODY MASS INDEX DOCD: CPT | Performed by: PHYSICIAN ASSISTANT

## 2021-03-15 PROCEDURE — 99214 OFFICE O/P EST MOD 30 MIN: CPT | Performed by: PHYSICIAN ASSISTANT

## 2021-03-15 PROCEDURE — 1036F TOBACCO NON-USER: CPT | Performed by: PHYSICIAN ASSISTANT

## 2021-03-15 RX ORDER — LORAZEPAM 0.5 MG/1
0.5 TABLET ORAL EVERY 8 HOURS PRN
Qty: 30 TABLET | Refills: 0 | Status: SHIPPED | OUTPATIENT
Start: 2021-03-15 | End: 2021-04-12 | Stop reason: SDUPTHER

## 2021-03-15 RX ORDER — VENLAFAXINE HYDROCHLORIDE 37.5 MG/1
37.5 CAPSULE, EXTENDED RELEASE ORAL
Qty: 30 CAPSULE | Refills: 1 | Status: SHIPPED | OUTPATIENT
Start: 2021-03-15 | End: 2021-04-12 | Stop reason: SDUPTHER

## 2021-03-15 NOTE — PROGRESS NOTES
Assessment/Plan:      -Start Effexor 37 5 mg  She states that she does have difficulty swallowing pills  I did advise her that this is a capsule and she can ask the pharmacist if she is able to open the capsule to put the powder on applesauce  - she will continue the Xanax as needed but I did advise her of the possible addicting tendencies especially if she finds that she is utilizing on a daily basis   -I did recommend she proceed with fasting blood work   - I did encourage her to find another counselor that she can talk to and feel comfortable with   - persistent cough post COVID-19  No concerns on exam at this time  Advised to call with any increasing symptoms   - I did recommend follow-up in 2 weeks  Call with any other concerns in the meantime    [employer] software was used to dictate this note  It may contain errors with dictating incorrect words/spelling  Please contact provider directly for any questions  Total Prescriptions:    1    Total Prescribers:    1    Total Pharmacies:    1    Narcotics* (excluding Buprenorphine)  Current Qty:   0   Current MME/day:   0 00   30 Day Avg MME/day:   0 00   Buprenorphine*  Current Qty:   0   Current mg/day:   0 00   30 Day Avg mg/day:   0 00   Prescriptions    *Highlighted drugs could not be included in score calculations   Prescriptions  Total Prescriptions: 1    Total Private Pay: 0    Fill Date ID   Written Drug Qty Days Prescriber Rx # Pharmacy Refill   Daily Dose* Pymt Type      05/15/2020  1   05/15/2020  Lorazepam 0 5 MG Tablet  25 00  25 Ta Fel   [id number]   Pen ([id number])   0   Comm Ins   PA          Diagnoses and all orders for this visit:    Anxiety  -     venlafaxine (EFFEXOR-XR) 37 5 mg 24 hr capsule; Take 1 capsule (37 5 mg total) by mouth daily with breakfast  -     LORazepam (ATIVAN) 0 5 mg tablet;  Take 1 tablet (0 5 mg total) by mouth every 8 (eight) hours as needed for anxiety  -     CBC and differential  -     Comprehensive metabolic panel  - Lipid panel  -     TSH, 3rd generation with Free T4 reflex    Situational depression  -     venlafaxine (EFFEXOR-XR) 37 5 mg 24 hr capsule; Take 1 capsule (37 5 mg total) by mouth daily with breakfast  -     CBC and differential  -     Comprehensive metabolic panel  -     Lipid panel  -     TSH, 3rd generation with Free T4 reflex    Primary hypertriglyceridemia  -     CBC and differential  -     Comprehensive metabolic panel  -     Lipid panel  -     TSH, 3rd generation with Free T4 reflex    Palpitations  -     CBC and differential  -     Comprehensive metabolic panel  -     Lipid panel  -     TSH, 3rd generation with Free T4 reflex    Post-viral cough syndrome          Subjective:      Patient ID: Johnny Smart is a 26 y o  female  Patient presents today for recurrent palpitations  She states that she has been under lot of stress because she is currently going through a custody agreement with her   She states that she does not have the money for a  to go through a divorce  She states that she can be at work and all of a sudden she starts developing palpitations  She does have crying episodes  She states that in the past she has been given a prescription for Paxil but she states that she was not able to tolerate the medication because of side effects  She would like a refill of the Xanax  She is taking it at least 2 or 3 times a week  She has not tried any other medications for anxiety  She used to see a psychologist but she feels as though she did not have a good rapport with the person  Denies any suicidal ideations  She does not check her pulse rate when she has a palpitations  She also states that she had COVID-19  She still notices a mild cough  She does notice a mild postnasal drip  She denies any shortness of breath, chest pain  The cough is not productive  Denies any swelling of her lower extremities        The following portions of the patient's history were reviewed and updated as appropriate:   She  has a past medical history of Anxiety, GERD (gastroesophageal reflux disease), and IUD (intrauterine device) in place  She   Patient Active Problem List    Diagnosis Date Noted    Palpitations 03/15/2021    Post-viral cough syndrome 03/15/2021    Primary hypertriglyceridemia 03/09/2021    BV (bacterial vaginosis) 12/10/2020    Encounter for gynecological examination with abnormal finding 05/28/2020    Screening for venereal disease 05/28/2020    Situational depression 05/21/2020    Well adult exam 01/21/2019    Brain cyst 01/21/2019    Flu vaccine need 01/21/2019    Anxiety 01/21/2019    Pap smear abnormality of cervix with LGSIL 12/28/2015    Gastroesophageal reflux disease 01/28/2011    Allergic rhinitis 06/09/1999     She  has no past surgical history on file  Her family history includes Diabetes in her father, paternal grandfather, and paternal grandmother; Hypertension in her mother; Rectal cancer in her paternal grandmother  She  reports that she has never smoked  She has never used smokeless tobacco  She reports current alcohol use  She reports that she does not use drugs  Current Outpatient Medications   Medication Sig Dispense Refill    cetirizine (ZyrTEC) 10 mg tablet Take 10 mg by mouth daily      LORazepam (ATIVAN) 0 5 mg tablet Take 1 tablet (0 5 mg total) by mouth every 8 (eight) hours as needed for anxiety 30 tablet 0    Norethin-Eth Estradiol-Fe 0 4-35 MG-MCG CHEW Chew 1 tablet      famotidine (PEPCID) 40 mg/5 mL suspension Take 2 5 mL (20 mg total) by mouth 2 (two) times a day (Patient not taking: Reported on 3/15/2021) 150 mL 1    fluticasone (FLONASE) 50 mcg/act nasal spray 1 spray into each nostril daily (Patient not taking: Reported on 1/5/2021) 1 Bottle 3    nystatin-triamcinolone (MYCOLOG-II) cream APPLY TOPICALLY 4 TIMES A DAY        venlafaxine (EFFEXOR-XR) 37 5 mg 24 hr capsule Take 1 capsule (37 5 mg total) by mouth daily with breakfast 30 capsule 1     No current facility-administered medications for this visit  Current Outpatient Medications on File Prior to Visit   Medication Sig    cetirizine (ZyrTEC) 10 mg tablet Take 10 mg by mouth daily    Norethin-Eth Estradiol-Fe 0 4-35 MG-MCG CHEW Chew 1 tablet    [DISCONTINUED] LORazepam (ATIVAN) 0 5 mg tablet Take 1 tablet (0 5 mg total) by mouth every 24 hours    famotidine (PEPCID) 40 mg/5 mL suspension Take 2 5 mL (20 mg total) by mouth 2 (two) times a day (Patient not taking: Reported on 3/15/2021)    fluticasone (FLONASE) 50 mcg/act nasal spray 1 spray into each nostril daily (Patient not taking: Reported on 1/5/2021)    nystatin-triamcinolone (MYCOLOG-II) cream APPLY TOPICALLY 4 TIMES A DAY  No current facility-administered medications on file prior to visit  She is allergic to albumen, egg and prednisone       Review of Systems   Constitutional: Positive for fatigue  Negative for unexpected weight change  Respiratory: Negative for shortness of breath  Cardiovascular: Negative for chest pain and leg swelling  Neurological: Positive for light-headedness  Psychiatric/Behavioral:          As stated in HPI         Objective:      /80 (BP Location: Left arm, Patient Position: Sitting, Cuff Size: Standard)   Pulse (!) 116   Temp 98 2 °F (36 8 °C) (Tympanic)   Resp 16   Ht 4' 10" (1 473 m)   Wt 45 6 kg (100 lb 9 6 oz)   SpO2 97%   BMI 21 03 kg/m²          Physical Exam  Constitutional:       General: She is not in acute distress  Appearance: Normal appearance  She is well-developed  She is not ill-appearing, toxic-appearing or diaphoretic  Comments:  Patient did get teary-eyed when I asked her if she has been under lot of stress lately  HENT:      Head: Normocephalic and atraumatic        Right Ear: Tympanic membrane, ear canal and external ear normal       Left Ear: Tympanic membrane, ear canal and external ear normal    Neck: Musculoskeletal: Neck supple  Thyroid: No thyromegaly  Cardiovascular:      Rate and Rhythm: Normal rate and regular rhythm  Heart sounds: Normal heart sounds  No murmur  No friction rub  No gallop  Pulmonary:      Effort: Pulmonary effort is normal  No respiratory distress  Breath sounds: Normal breath sounds  No wheezing, rhonchi or rales  Abdominal:      General: Bowel sounds are normal       Palpations: Abdomen is soft  There is no mass  Tenderness: There is no abdominal tenderness  Musculoskeletal:         General: No deformity  Right lower leg: No edema  Left lower leg: No edema  Lymphadenopathy:      Cervical: No cervical adenopathy  Skin:     General: Skin is warm  Neurological:      General: No focal deficit present  Mental Status: She is alert  Psychiatric:         Mood and Affect: Mood normal          Behavior: Behavior normal          Thought Content:  Thought content normal          Judgment: Judgment normal

## 2021-03-22 ENCOUNTER — APPOINTMENT (OUTPATIENT)
Dept: LAB | Facility: IMAGING CENTER | Age: 31
End: 2021-03-22
Payer: COMMERCIAL

## 2021-03-22 LAB
ALBUMIN SERPL BCP-MCNC: 3.5 G/DL (ref 3.5–5)
ALP SERPL-CCNC: 52 U/L (ref 46–116)
ALT SERPL W P-5'-P-CCNC: 21 U/L (ref 12–78)
ANION GAP SERPL CALCULATED.3IONS-SCNC: 5 MMOL/L (ref 4–13)
AST SERPL W P-5'-P-CCNC: 16 U/L (ref 5–45)
BASOPHILS # BLD AUTO: 0.04 THOUSANDS/ΜL (ref 0–0.1)
BASOPHILS NFR BLD AUTO: 1 % (ref 0–1)
BILIRUB SERPL-MCNC: 0.36 MG/DL (ref 0.2–1)
BUN SERPL-MCNC: 9 MG/DL (ref 5–25)
CALCIUM SERPL-MCNC: 8.9 MG/DL (ref 8.3–10.1)
CHLORIDE SERPL-SCNC: 107 MMOL/L (ref 100–108)
CHOLEST SERPL-MCNC: 184 MG/DL (ref 50–200)
CO2 SERPL-SCNC: 26 MMOL/L (ref 21–32)
CREAT SERPL-MCNC: 0.78 MG/DL (ref 0.6–1.3)
EOSINOPHIL # BLD AUTO: 0.18 THOUSAND/ΜL (ref 0–0.61)
EOSINOPHIL NFR BLD AUTO: 2 % (ref 0–6)
ERYTHROCYTE [DISTWIDTH] IN BLOOD BY AUTOMATED COUNT: 12.3 % (ref 11.6–15.1)
GFR SERPL CREATININE-BSD FRML MDRD: 101 ML/MIN/1.73SQ M
GLUCOSE P FAST SERPL-MCNC: 83 MG/DL (ref 65–99)
HCT VFR BLD AUTO: 40.9 % (ref 34.8–46.1)
HDLC SERPL-MCNC: 65 MG/DL
HGB BLD-MCNC: 13.2 G/DL (ref 11.5–15.4)
IMM GRANULOCYTES # BLD AUTO: 0.04 THOUSAND/UL (ref 0–0.2)
IMM GRANULOCYTES NFR BLD AUTO: 1 % (ref 0–2)
LDLC SERPL CALC-MCNC: 94 MG/DL (ref 0–100)
LYMPHOCYTES # BLD AUTO: 2.58 THOUSANDS/ΜL (ref 0.6–4.47)
LYMPHOCYTES NFR BLD AUTO: 33 % (ref 14–44)
MCH RBC QN AUTO: 30.8 PG (ref 26.8–34.3)
MCHC RBC AUTO-ENTMCNC: 32.3 G/DL (ref 31.4–37.4)
MCV RBC AUTO: 95 FL (ref 82–98)
MONOCYTES # BLD AUTO: 0.63 THOUSAND/ΜL (ref 0.17–1.22)
MONOCYTES NFR BLD AUTO: 8 % (ref 4–12)
NEUTROPHILS # BLD AUTO: 4.46 THOUSANDS/ΜL (ref 1.85–7.62)
NEUTS SEG NFR BLD AUTO: 55 % (ref 43–75)
NONHDLC SERPL-MCNC: 119 MG/DL
NRBC BLD AUTO-RTO: 0 /100 WBCS
PLATELET # BLD AUTO: 335 THOUSANDS/UL (ref 149–390)
PMV BLD AUTO: 10.5 FL (ref 8.9–12.7)
POTASSIUM SERPL-SCNC: 4 MMOL/L (ref 3.5–5.3)
PROT SERPL-MCNC: 7.2 G/DL (ref 6.4–8.2)
RBC # BLD AUTO: 4.29 MILLION/UL (ref 3.81–5.12)
SODIUM SERPL-SCNC: 138 MMOL/L (ref 136–145)
TRIGL SERPL-MCNC: 124 MG/DL
TSH SERPL DL<=0.05 MIU/L-ACNC: 0.92 UIU/ML (ref 0.36–3.74)
WBC # BLD AUTO: 7.93 THOUSAND/UL (ref 4.31–10.16)

## 2021-03-22 PROCEDURE — 80061 LIPID PANEL: CPT | Performed by: PHYSICIAN ASSISTANT

## 2021-03-22 PROCEDURE — 80053 COMPREHEN METABOLIC PANEL: CPT | Performed by: PHYSICIAN ASSISTANT

## 2021-03-22 PROCEDURE — 84443 ASSAY THYROID STIM HORMONE: CPT | Performed by: PHYSICIAN ASSISTANT

## 2021-03-22 PROCEDURE — 36415 COLL VENOUS BLD VENIPUNCTURE: CPT | Performed by: PHYSICIAN ASSISTANT

## 2021-03-22 PROCEDURE — 85025 COMPLETE CBC W/AUTO DIFF WBC: CPT | Performed by: PHYSICIAN ASSISTANT

## 2021-03-23 ENCOUNTER — TELEPHONE (OUTPATIENT)
Dept: FAMILY MEDICINE CLINIC | Facility: CLINIC | Age: 31
End: 2021-03-23

## 2021-03-23 NOTE — TELEPHONE ENCOUNTER
----- Message from Conchis Danielson PA-C sent at 3/23/2021  6:53 AM EDT -----  Please let her know that her blood work is good  She may have are recheck this in my chart    She does have an upcoming appointment with me April 5th

## 2021-04-02 ENCOUNTER — TELEPHONE (OUTPATIENT)
Dept: FAMILY MEDICINE CLINIC | Facility: CLINIC | Age: 31
End: 2021-04-02

## 2021-04-02 NOTE — TELEPHONE ENCOUNTER
Pt l/m stating since she started Effexor on Sunday, she has trouble sleeping  Pt is getting 2-3 hrs of sleep at night  She is asking for advice

## 2021-04-02 NOTE — TELEPHONE ENCOUNTER
Pt did not start Lorazepam  She will start that [facility]  Pt will call with any issues/questions

## 2021-04-09 DIAGNOSIS — F41.9 ANXIETY: ICD-10-CM

## 2021-04-09 DIAGNOSIS — F43.21 SITUATIONAL DEPRESSION: ICD-10-CM

## 2021-04-12 ENCOUNTER — OFFICE VISIT (OUTPATIENT)
Dept: FAMILY MEDICINE CLINIC | Facility: CLINIC | Age: 31
End: 2021-04-12
Payer: COMMERCIAL

## 2021-04-12 VITALS
WEIGHT: 100.4 LBS | TEMPERATURE: 97.7 F | HEART RATE: 101 BPM | OXYGEN SATURATION: 99 % | SYSTOLIC BLOOD PRESSURE: 124 MMHG | HEIGHT: 58 IN | BODY MASS INDEX: 21.07 KG/M2 | RESPIRATION RATE: 16 BRPM | DIASTOLIC BLOOD PRESSURE: 74 MMHG

## 2021-04-12 DIAGNOSIS — F51.01 PRIMARY INSOMNIA: ICD-10-CM

## 2021-04-12 DIAGNOSIS — F41.9 ANXIETY: Primary | ICD-10-CM

## 2021-04-12 DIAGNOSIS — F43.21 SITUATIONAL DEPRESSION: ICD-10-CM

## 2021-04-12 PROCEDURE — 3008F BODY MASS INDEX DOCD: CPT | Performed by: PHYSICIAN ASSISTANT

## 2021-04-12 PROCEDURE — 1036F TOBACCO NON-USER: CPT | Performed by: PHYSICIAN ASSISTANT

## 2021-04-12 PROCEDURE — 99213 OFFICE O/P EST LOW 20 MIN: CPT | Performed by: PHYSICIAN ASSISTANT

## 2021-04-12 RX ORDER — VENLAFAXINE HYDROCHLORIDE 37.5 MG/1
37.5 CAPSULE, EXTENDED RELEASE ORAL
Qty: 90 CAPSULE | Refills: 1 | Status: SHIPPED | OUTPATIENT
Start: 2021-04-12 | End: 2021-09-15 | Stop reason: SDUPTHER

## 2021-04-12 RX ORDER — TRAZODONE HYDROCHLORIDE 50 MG/1
TABLET ORAL
Qty: 45 TABLET | Refills: 1 | Status: SHIPPED | OUTPATIENT
Start: 2021-04-12 | End: 2021-08-30

## 2021-04-12 RX ORDER — VENLAFAXINE HYDROCHLORIDE 37.5 MG/1
CAPSULE, EXTENDED RELEASE ORAL
Qty: 30 CAPSULE | Refills: 1 | OUTPATIENT
Start: 2021-04-12

## 2021-04-12 RX ORDER — LORAZEPAM 0.5 MG/1
0.5 TABLET ORAL EVERY 8 HOURS PRN
Qty: 30 TABLET | Refills: 0 | Status: SHIPPED | OUTPATIENT
Start: 2021-04-12 | End: 2021-05-24 | Stop reason: SDUPTHER

## 2021-04-12 NOTE — PROGRESS NOTES
Assessment/Plan:     -continue Effexor 37 5 mg daily  -she will continue lorazepam as needed at bedtime but I did recommend starting trazodone 50 mg 1/2 tablet daily at bedtime which does not have any addicting tendencies  I did discuss serotonin syndrome and call with any symptoms  - I did recommend follow-up in 6 weeks    [employer] software was used to dictate this note  It may contain errors with dictating incorrect words/spelling  Please contact provider directly for any questions  Summary  Total Prescriptions:    2    Total Prescribers:    1    Total Pharmacies:    1    Narcotics* (excluding Buprenorphine)  Current Qty:   0   Current MME/day:   0 00   30 Day Avg MME/day:   0 00   Buprenorphine*  Current Qty:   0   Current mg/day:   0 00   30 Day Avg mg/day:   0 00   Prescriptions    *Highlighted drugs could not be included in score calculations   Prescriptions  Total Prescriptions: 2    Total Private Pay: 0    Fill Date ID   Written Drug Qty Days Prescriber Rx # Pharmacy Refill   Daily Dose* Pymt Type      03/15/2021  1   03/15/2021  Lorazepam 0 5 MG Tablet  30 00  10 Ta Fel   [id number]   Pen ([id number])   0   Comm Ins   PA   05/15/2020  1   05/15/2020  Lorazepam 0 5 MG Tablet  25 00  25 Ta Fel   [id number]   Pen ([id number])   0   Comm Ins   PA          Diagnoses and all orders for this visit:    Anxiety  -     venlafaxine (EFFEXOR-XR) 37 5 mg 24 hr capsule; Take 1 capsule (37 5 mg total) by mouth daily with breakfast  -     LORazepam (ATIVAN) 0 5 mg tablet; Take 1 tablet (0 5 mg total) by mouth every 8 (eight) hours as needed for anxiety    Situational depression  -     venlafaxine (EFFEXOR-XR) 37 5 mg 24 hr capsule; Take 1 capsule (37 5 mg total) by mouth daily with breakfast    Primary insomnia  -     traZODone (DESYREL) 50 mg tablet; Take 1/2 tablet at bedtime          Subjective:      Patient ID: Cristiana Kline is a 26 y o  female       Patient returns today for follow-up of situational depression / anxiety after starting Effexor 37 5 mg daily  She states overall she has noticed quite a bit of improvement of her symptoms although she states that she has having difficulty sleeping at night since she started the medicine  She has been taking lorazepam at bedtime which seems to be effective  She denies any suicidal ideations  The following portions of the patient's history were reviewed and updated as appropriate:   She  has a past medical history of Anxiety, GERD (gastroesophageal reflux disease), and IUD (intrauterine device) in place  She   Patient Active Problem List    Diagnosis Date Noted    Primary insomnia 04/12/2021    Palpitations 03/15/2021    Post-viral cough syndrome 03/15/2021    Primary hypertriglyceridemia 03/09/2021    BV (bacterial vaginosis) 12/10/2020    Encounter for gynecological examination with abnormal finding 05/28/2020    Screening for venereal disease 05/28/2020    Situational depression 05/21/2020    Well adult exam 01/21/2019    Brain cyst 01/21/2019    Flu vaccine need 01/21/2019    Anxiety 01/21/2019    Pap smear abnormality of cervix with LGSIL 12/28/2015    Gastroesophageal reflux disease 01/28/2011    Allergic rhinitis 06/09/1999     She  has no past surgical history on file  Her family history includes Diabetes in her father, paternal grandfather, and paternal grandmother; Hypertension in her mother; Rectal cancer in her paternal grandmother  She  reports that she has never smoked  She has never used smokeless tobacco  She reports current alcohol use  She reports that she does not use drugs    Current Outpatient Medications   Medication Sig Dispense Refill    cetirizine (ZyrTEC) 10 mg tablet Take 10 mg by mouth daily      LORazepam (ATIVAN) 0 5 mg tablet Take 1 tablet (0 5 mg total) by mouth every 8 (eight) hours as needed for anxiety 30 tablet 0    Norethin-Eth Estradiol-Fe 0 4-35 MG-MCG CHEW Chew 1 tablet      venlafaxine (EFFEXOR-XR) 37 5 mg 24 hr capsule Take 1 capsule (37 5 mg total) by mouth daily with breakfast 90 capsule 1    famotidine (PEPCID) 40 mg/5 mL suspension Take 2 5 mL (20 mg total) by mouth 2 (two) times a day (Patient not taking: Reported on 3/15/2021) 150 mL 1    fluticasone (FLONASE) 50 mcg/act nasal spray 1 spray into each nostril daily (Patient not taking: Reported on 1/5/2021) 1 Bottle 3    nystatin-triamcinolone (MYCOLOG-II) cream APPLY TOPICALLY 4 TIMES A DAY   traZODone (DESYREL) 50 mg tablet Take 1/2 tablet at bedtime 45 tablet 1     No current facility-administered medications for this visit  Current Outpatient Medications on File Prior to Visit   Medication Sig    cetirizine (ZyrTEC) 10 mg tablet Take 10 mg by mouth daily    Norethin-Eth Estradiol-Fe 0 4-35 MG-MCG CHEW Chew 1 tablet    [DISCONTINUED] LORazepam (ATIVAN) 0 5 mg tablet Take 1 tablet (0 5 mg total) by mouth every 8 (eight) hours as needed for anxiety    [DISCONTINUED] venlafaxine (EFFEXOR-XR) 37 5 mg 24 hr capsule Take 1 capsule (37 5 mg total) by mouth daily with breakfast    famotidine (PEPCID) 40 mg/5 mL suspension Take 2 5 mL (20 mg total) by mouth 2 (two) times a day (Patient not taking: Reported on 3/15/2021)    fluticasone (FLONASE) 50 mcg/act nasal spray 1 spray into each nostril daily (Patient not taking: Reported on 1/5/2021)    nystatin-triamcinolone (MYCOLOG-II) cream APPLY TOPICALLY 4 TIMES A DAY  No current facility-administered medications on file prior to visit  She is allergic to albumen, egg - food allergy and prednisone       Review of Systems   Psychiatric/Behavioral:          As stated in HPI         Objective:      /74 (BP Location: Left arm, Patient Position: Sitting, Cuff Size: Standard)   Pulse 101   Temp 97 7 °F (36 5 °C) (Tympanic)   Resp 16   Ht 4' 10" (1 473 m)   Wt 45 5 kg (100 lb 6 4 oz)   SpO2 99%   BMI 20 98 kg/m²          Physical Exam  Constitutional:       General: She is not in acute distress  Appearance: Normal appearance  She is not ill-appearing, toxic-appearing or diaphoretic  Neurological:      General: No focal deficit present  Mental Status: She is alert  Psychiatric:         Mood and Affect: Mood normal          Behavior: Behavior normal          Thought Content:  Thought content normal          Judgment: Judgment normal

## 2021-05-24 ENCOUNTER — OFFICE VISIT (OUTPATIENT)
Dept: FAMILY MEDICINE CLINIC | Facility: CLINIC | Age: 31
End: 2021-05-24
Payer: COMMERCIAL

## 2021-05-24 VITALS
WEIGHT: 99.5 LBS | HEIGHT: 58 IN | SYSTOLIC BLOOD PRESSURE: 118 MMHG | HEART RATE: 80 BPM | OXYGEN SATURATION: 98 % | DIASTOLIC BLOOD PRESSURE: 70 MMHG | RESPIRATION RATE: 16 BRPM | BODY MASS INDEX: 20.88 KG/M2 | TEMPERATURE: 98.5 F

## 2021-05-24 DIAGNOSIS — F41.9 ANXIETY: ICD-10-CM

## 2021-05-24 DIAGNOSIS — F43.21 SITUATIONAL DEPRESSION: Primary | ICD-10-CM

## 2021-05-24 DIAGNOSIS — F51.01 PRIMARY INSOMNIA: ICD-10-CM

## 2021-05-24 PROCEDURE — 99213 OFFICE O/P EST LOW 20 MIN: CPT | Performed by: PHYSICIAN ASSISTANT

## 2021-05-24 PROCEDURE — 3008F BODY MASS INDEX DOCD: CPT | Performed by: PHYSICIAN ASSISTANT

## 2021-05-24 PROCEDURE — 1036F TOBACCO NON-USER: CPT | Performed by: PHYSICIAN ASSISTANT

## 2021-05-24 RX ORDER — LORAZEPAM 0.5 MG/1
0.5 TABLET ORAL EVERY 8 HOURS PRN
Qty: 30 TABLET | Refills: 0 | Status: SHIPPED | OUTPATIENT
Start: 2021-05-24

## 2021-05-24 NOTE — PROGRESS NOTES
Assessment/Plan:     -I did renew the lorazepam   She is aware of the possible addicting tendencies with overuse   -continue the trazodone 50 mg 1/4 of the tablet daily   -continue the Effexor   -recommend follow-up in 3 months    M*[employer] software was used to dictate this note  It may contain errors with dictating incorrect words/spelling  Please contact provider directly for any questions  Summary    Summary  Total Prescriptions:    3    Total Prescribers:    1    Total Pharmacies:    1    Narcotics* (excluding Buprenorphine)  Current Qty:   0   Current MME/day:   0 00   30 Day Avg MME/day:   0 00   Buprenorphine*  Current Qty:   0   Current mg/day:   0 00   30 Day Avg mg/day:   0 00   Prescriptions    *Highlighted drugs could not be included in score calculations   Prescriptions  Total Prescriptions: 3    Total Private Pay: 0    Fill Date ID   Written Drug Qty Days Prescriber Rx # Pharmacy Refill   Daily Dose* Pymt Type      04/12/2021  1   04/12/2021  Lorazepam 0 5 MG Tablet  30 00  10 Ta Fel   [id number]   Pen ([id number])   0   [facility]   [place]   03/15/2021  1   03/15/2021  Lorazepam 0 5 MG Tablet  30 00  10 Ta Fel   [id number]   Pen ([id number])   0   [facility]   [place]   05/15/2020  1   05/15/2020  Lorazepam 0 5 MG Tablet  25 00  25 Ta Fel   [id number]   Pen ([id number])   0              Diagnoses and all orders for this visit:    Situational depression    Anxiety  -     LORazepam (ATIVAN) 0 5 mg tablet; Take 1 tablet (0 5 mg total) by mouth every 8 (eight) hours as needed for anxiety    Primary insomnia          Subjective:      Patient ID: Bailey Daigle is a 33 y o  female  Patient presents today for routine follow-up for depression, anxiety, insomnia  She states it has been a little rough over the last several weeks because of the passing of her mom  She has been utilizing the Xanax at least once a day    She is taking the trazodone 50 mg a quarter of a tablet at bedtime which has been beneficial   She denies any suicidal ideations  The following portions of the patient's history were reviewed and updated as appropriate:   She  has a past medical history of Anxiety, GERD (gastroesophageal reflux disease), and IUD (intrauterine device) in place  She   Patient Active Problem List    Diagnosis Date Noted    Primary insomnia 04/12/2021    Palpitations 03/15/2021    Post-viral cough syndrome 03/15/2021    Primary hypertriglyceridemia 03/09/2021    BV (bacterial vaginosis) 12/10/2020    Encounter for gynecological examination with abnormal finding 05/28/2020    Screening for venereal disease 05/28/2020    Situational depression 05/21/2020    Well adult exam 01/21/2019    Brain cyst 01/21/2019    Flu vaccine need 01/21/2019    Anxiety 01/21/2019    Pap smear abnormality of cervix with LGSIL 12/28/2015    Gastroesophageal reflux disease 01/28/2011    Allergic rhinitis 06/09/1999     She  has no past surgical history on file  Her family history includes Diabetes in her father, paternal grandfather, and paternal grandmother; Hypertension in her mother; Rectal cancer in her paternal grandmother  She  reports that she has never smoked  She has never used smokeless tobacco  She reports current alcohol use  She reports that she does not use drugs    Current Outpatient Medications   Medication Sig Dispense Refill    cetirizine (ZyrTEC) 10 mg tablet Take 10 mg by mouth daily      LORazepam (ATIVAN) 0 5 mg tablet Take 1 tablet (0 5 mg total) by mouth every 8 (eight) hours as needed for anxiety 30 tablet 0    Norethin-Eth Estradiol-Fe 0 4-35 MG-MCG CHEW Chew 1 tablet      traZODone (DESYREL) 50 mg tablet Take 1/2 tablet at bedtime 45 tablet 1    venlafaxine (EFFEXOR-XR) 37 5 mg 24 hr capsule Take 1 capsule (37 5 mg total) by mouth daily with breakfast 90 capsule 1    famotidine (PEPCID) 40 mg/5 mL suspension Take 2 5 mL (20 mg total) by mouth 2 (two) times a day (Patient not taking: Reported on 3/15/2021) 150 mL 1    fluticasone (FLONASE) 50 mcg/act nasal spray 1 spray into each nostril daily (Patient not taking: Reported on 1/5/2021) 1 Bottle 3    nystatin-triamcinolone (MYCOLOG-II) cream APPLY TOPICALLY 4 TIMES A DAY  No current facility-administered medications for this visit  Current Outpatient Medications on File Prior to Visit   Medication Sig    cetirizine (ZyrTEC) 10 mg tablet Take 10 mg by mouth daily    Norethin-Eth Estradiol-Fe 0 4-35 MG-MCG CHEW Chew 1 tablet    traZODone (DESYREL) 50 mg tablet Take 1/2 tablet at bedtime    venlafaxine (EFFEXOR-XR) 37 5 mg 24 hr capsule Take 1 capsule (37 5 mg total) by mouth daily with breakfast    [DISCONTINUED] LORazepam (ATIVAN) 0 5 mg tablet Take 1 tablet (0 5 mg total) by mouth every 8 (eight) hours as needed for anxiety    famotidine (PEPCID) 40 mg/5 mL suspension Take 2 5 mL (20 mg total) by mouth 2 (two) times a day (Patient not taking: Reported on 3/15/2021)    fluticasone (FLONASE) 50 mcg/act nasal spray 1 spray into each nostril daily (Patient not taking: Reported on 1/5/2021)    nystatin-triamcinolone (MYCOLOG-II) cream APPLY TOPICALLY 4 TIMES A DAY  No current facility-administered medications on file prior to visit  She is allergic to albumen, egg - food allergy and prednisone       Review of Systems   Psychiatric/Behavioral:          As stated in HPI         Objective:      /70 (BP Location: Left arm, Patient Position: Sitting, Cuff Size: Adult)   Pulse 80   Temp 98 5 °F (36 9 °C) (Tympanic)   Resp 16   Ht 4' 10" (1 473 m)   Wt 45 1 kg (99 lb 8 oz)   SpO2 98%   BMI 20 80 kg/m²          Physical Exam  Constitutional:       General: She is not in acute distress  Appearance: Normal appearance  She is not ill-appearing, toxic-appearing or diaphoretic  HENT:      Head: Normocephalic and atraumatic  Neurological:      General: No focal deficit present  Mental Status: She is alert     Psychiatric:         Mood and Affect: Mood normal          Behavior: Behavior normal          Thought Content:  Thought content normal          Judgment: Judgment normal

## 2021-06-25 DIAGNOSIS — J30.1 SEASONAL ALLERGIC RHINITIS DUE TO POLLEN: ICD-10-CM

## 2021-06-28 RX ORDER — FLUTICASONE PROPIONATE 50 MCG
SPRAY, SUSPENSION (ML) NASAL
Qty: 16 ML | Refills: 3 | Status: SHIPPED | OUTPATIENT
Start: 2021-06-28 | End: 2022-01-24 | Stop reason: SDUPTHER

## 2021-08-30 ENCOUNTER — OFFICE VISIT (OUTPATIENT)
Dept: FAMILY MEDICINE CLINIC | Facility: CLINIC | Age: 31
End: 2021-08-30
Payer: COMMERCIAL

## 2021-08-30 ENCOUNTER — TELEPHONE (OUTPATIENT)
Dept: FAMILY MEDICINE CLINIC | Facility: CLINIC | Age: 31
End: 2021-08-30

## 2021-08-30 VITALS
DIASTOLIC BLOOD PRESSURE: 70 MMHG | HEART RATE: 93 BPM | BODY MASS INDEX: 21.54 KG/M2 | SYSTOLIC BLOOD PRESSURE: 110 MMHG | WEIGHT: 102.6 LBS | TEMPERATURE: 98.9 F | HEIGHT: 58 IN | RESPIRATION RATE: 18 BRPM | OXYGEN SATURATION: 99 %

## 2021-08-30 DIAGNOSIS — M54.9 MID BACK PAIN: ICD-10-CM

## 2021-08-30 DIAGNOSIS — R10.11 RIGHT UPPER QUADRANT PAIN: ICD-10-CM

## 2021-08-30 DIAGNOSIS — K21.9 GASTROESOPHAGEAL REFLUX DISEASE: ICD-10-CM

## 2021-08-30 DIAGNOSIS — E78.1 PRIMARY HYPERTRIGLYCERIDEMIA: ICD-10-CM

## 2021-08-30 DIAGNOSIS — R53.83 OTHER FATIGUE: ICD-10-CM

## 2021-08-30 DIAGNOSIS — F41.9 ANXIETY: ICD-10-CM

## 2021-08-30 DIAGNOSIS — F43.21 SITUATIONAL DEPRESSION: Primary | ICD-10-CM

## 2021-08-30 PROBLEM — F51.01 PRIMARY INSOMNIA: Status: RESOLVED | Noted: 2021-04-12 | Resolved: 2021-08-30

## 2021-08-30 PROCEDURE — 99214 OFFICE O/P EST MOD 30 MIN: CPT | Performed by: PHYSICIAN ASSISTANT

## 2021-08-30 PROCEDURE — 3008F BODY MASS INDEX DOCD: CPT | Performed by: PHYSICIAN ASSISTANT

## 2021-08-30 PROCEDURE — 1036F TOBACCO NON-USER: CPT | Performed by: PHYSICIAN ASSISTANT

## 2021-08-30 RX ORDER — FAMOTIDINE 40 MG/5ML
20 POWDER, FOR SUSPENSION ORAL 2 TIMES DAILY
Qty: 150 ML | Refills: 1 | Status: SHIPPED | OUTPATIENT
Start: 2021-08-30 | End: 2021-11-18 | Stop reason: SDUPTHER

## 2021-08-30 NOTE — PROGRESS NOTES
Assessment/Plan:      -Restart the Famotidine suspension 20 mg once daily rather than twice daily   - increase the Effexor from 37 5 mg up to 75 mg daily  She will call me if she is tolerating this dose to send a new prescription to the pharmacy   -continue with Xanax as needed which she states is only about once a week  -refer to physical therapy for right-sided mid back pain   -proceed with fasting blood work   -refer to gastroenterology again   -advised to follow-up in 1 month if increases the Effexor to recheck her blood pressure and see how she is doing on the medicine  If she does not increase the dose recommend follow-up in 3 months    M*[employer] software was used to dictate this note  It may contain errors with dictating incorrect words/spelling  Please contact provider directly for any questions  Diagnoses and all orders for this visit:    Situational depression  -     CBC and differential  -     Comprehensive metabolic panel  -     Lipid panel  -     TSH, 3rd generation with Free T4 reflex    Gastroesophageal reflux disease  -     famotidine (PEPCID) 20 mg/2 5 mL oral suspension; Take 2 5 mL (20 mg total) by mouth 2 (two) times a day  -     Ambulatory referral to Gastroenterology; Future  -     CBC and differential  -     Comprehensive metabolic panel  -     Lipid panel  -     TSH, 3rd generation with Free T4 reflex    Anxiety  -     CBC and differential  -     Comprehensive metabolic panel  -     Lipid panel  -     TSH, 3rd generation with Free T4 reflex    Right upper quadrant pain  -     Ambulatory referral to Gastroenterology;  Future  -     CBC and differential  -     Comprehensive metabolic panel  -     Lipid panel  -     TSH, 3rd generation with Free T4 reflex    Primary hypertriglyceridemia  -     CBC and differential  -     Comprehensive metabolic panel  -     Lipid panel  -     TSH, 3rd generation with Free T4 reflex    Other fatigue  -     CBC and differential  -     Comprehensive metabolic panel  -     Lipid panel  -     TSH, 3rd generation with Free T4 reflex    Mid back pain  -     Ambulatory referral to Physical Therapy; Future          Subjective:      Patient ID: Glory Eisenberg is a 33 y o  female  Patient presents today for routine follow-up for depression and anxiety  She states that she has been sleeping a lot so she discontinue the trazodone  She states she has not been on it for a while  She continues on the Effexor 37 5 mg  She states that she does miss a dose she does have memorable dreams  She denies any suicidal ideations  Her last blood work was in March  Also continues with intermittent right upper quadrant pain and recurrent reflux symptoms  She is not on the Famotidine suspension at this time  She used to take it twice daily  It did help her symptoms  She has been taking Tums now instead  She  Denies any nausea, vomiting or changes in her bowels including blood  I did refer to gastroenterology but she never made an appointment  She has never had an endoscopy  She states that she has had an ultrasound done in the past with no specific findings  Also complains of right-sided mid back pain for about 1 year  She states that time she does have difficulty getting out of bed because of the pain  She denies any specific injury  Denies any treatment  The following portions of the patient's history were reviewed and updated as appropriate: She  has a past medical history of Anxiety, GERD (gastroesophageal reflux disease), and IUD (intrauterine device) in place    She   Patient Active Problem List    Diagnosis Date Noted    Other fatigue 08/30/2021    Mid back pain 08/30/2021    Palpitations 03/15/2021    Post-viral cough syndrome 03/15/2021    Primary hypertriglyceridemia 03/09/2021    BV (bacterial vaginosis) 12/10/2020    Encounter for gynecological examination with abnormal finding 05/28/2020    Screening for venereal disease 05/28/2020    Situational depression 05/21/2020    Well adult exam 01/21/2019    Brain cyst 01/21/2019    Flu vaccine need 01/21/2019    Anxiety 01/21/2019    Pap smear abnormality of cervix with LGSIL 12/28/2015    Gastroesophageal reflux disease 01/28/2011    Allergic rhinitis 06/09/1999     She  has no past surgical history on file  Her family history includes Diabetes in her father, paternal grandfather, and paternal grandmother; Hypertension in her mother; Rectal cancer in her paternal grandmother  She  reports that she has never smoked  She has never used smokeless tobacco  She reports current alcohol use  She reports that she does not use drugs  Current Outpatient Medications   Medication Sig Dispense Refill    cetirizine (ZyrTEC) 10 mg tablet Take 10 mg by mouth daily      fluticasone (FLONASE) 50 mcg/act nasal spray SPRAY 1 SPRAY INTO EACH NOSTRIL EVERY DAY 16 mL 3    LORazepam (ATIVAN) 0 5 mg tablet Take 1 tablet (0 5 mg total) by mouth every 8 (eight) hours as needed for anxiety 30 tablet 0    Norethin-Eth Estradiol-Fe 0 4-35 MG-MCG CHEW Chew 1 tablet      venlafaxine (EFFEXOR-XR) 37 5 mg 24 hr capsule Take 1 capsule (37 5 mg total) by mouth daily with breakfast 90 capsule 1    famotidine (PEPCID) 20 mg/2 5 mL oral suspension Take 2 5 mL (20 mg total) by mouth 2 (two) times a day 150 mL 1    nystatin-triamcinolone (MYCOLOG-II) cream APPLY TOPICALLY 4 TIMES A DAY  (Patient not taking: Reported on 8/30/2021)       No current facility-administered medications for this visit       Current Outpatient Medications on File Prior to Visit   Medication Sig    cetirizine (ZyrTEC) 10 mg tablet Take 10 mg by mouth daily    fluticasone (FLONASE) 50 mcg/act nasal spray SPRAY 1 SPRAY INTO EACH NOSTRIL EVERY DAY    LORazepam (ATIVAN) 0 5 mg tablet Take 1 tablet (0 5 mg total) by mouth every 8 (eight) hours as needed for anxiety    Norethin-Eth Estradiol-Fe 0 4-35 MG-MCG CHEW Chew 1 tablet    venlafaxine (EFFEXOR-XR) 37 5 mg 24 hr capsule Take 1 capsule (37 5 mg total) by mouth daily with breakfast    [DISCONTINUED] traZODone (DESYREL) 50 mg tablet Take 1/2 tablet at bedtime    nystatin-triamcinolone (MYCOLOG-II) cream APPLY TOPICALLY 4 TIMES A DAY  (Patient not taking: Reported on 8/30/2021)    [DISCONTINUED] famotidine (PEPCID) 40 mg/5 mL suspension Take 2 5 mL (20 mg total) by mouth 2 (two) times a day (Patient not taking: Reported on 3/15/2021)     No current facility-administered medications on file prior to visit       Review of Systems   Constitutional: Negative  Gastrointestinal:        As stated in HPI   Musculoskeletal:        As stated in HPI   Psychiatric/Behavioral:        As stated in HPI         Objective:      /70 (BP Location: Left arm, Patient Position: Sitting, Cuff Size: Adult)   Pulse 93   Temp 98 9 °F (37 2 °C)   Resp 18   Ht 4' 10" (1 473 m)   Wt 46 5 kg (102 lb 9 6 oz)   SpO2 99%   BMI 21 44 kg/m²          Physical Exam  Constitutional:       General: She is not in acute distress  Appearance: Normal appearance  She is well-developed  She is not ill-appearing, toxic-appearing or diaphoretic  HENT:      Head: Normocephalic and atraumatic  Right Ear: Tympanic membrane, ear canal and external ear normal       Left Ear: Tympanic membrane, ear canal and external ear normal    Neck:      Thyroid: No thyromegaly  Cardiovascular:      Rate and Rhythm: Normal rate and regular rhythm  Heart sounds: Normal heart sounds  No murmur heard  No friction rub  No gallop  Pulmonary:      Effort: Pulmonary effort is normal  No respiratory distress  Breath sounds: Normal breath sounds  No wheezing, rhonchi or rales  Abdominal:      General: Bowel sounds are normal       Palpations: Abdomen is soft  There is no mass  Tenderness: There is no abdominal tenderness  Musculoskeletal:         General: No deformity  Cervical back: Neck supple  Lymphadenopathy:      Cervical: No cervical adenopathy  Skin:     General: Skin is warm  Neurological:      General: No focal deficit present  Mental Status: She is alert  Psychiatric:         Mood and Affect: Mood normal          Behavior: Behavior normal          Thought Content:  Thought content normal          Judgment: Judgment normal

## 2021-09-13 ENCOUNTER — TELEPHONE (OUTPATIENT)
Dept: FAMILY MEDICINE CLINIC | Facility: CLINIC | Age: 31
End: 2021-09-13

## 2021-09-13 ENCOUNTER — APPOINTMENT (OUTPATIENT)
Dept: LAB | Facility: IMAGING CENTER | Age: 31
End: 2021-09-13
Payer: COMMERCIAL

## 2021-09-13 LAB
ALBUMIN SERPL BCP-MCNC: 3.2 G/DL (ref 3.5–5)
ALP SERPL-CCNC: 46 U/L (ref 46–116)
ALT SERPL W P-5'-P-CCNC: 16 U/L (ref 12–78)
ANION GAP SERPL CALCULATED.3IONS-SCNC: 5 MMOL/L (ref 4–13)
AST SERPL W P-5'-P-CCNC: 13 U/L (ref 5–45)
BASOPHILS # BLD AUTO: 0.06 THOUSANDS/ΜL (ref 0–0.1)
BASOPHILS NFR BLD AUTO: 1 % (ref 0–1)
BILIRUB SERPL-MCNC: 0.31 MG/DL (ref 0.2–1)
BUN SERPL-MCNC: 13 MG/DL (ref 5–25)
CALCIUM ALBUM COR SERPL-MCNC: 9.2 MG/DL (ref 8.3–10.1)
CALCIUM SERPL-MCNC: 8.6 MG/DL (ref 8.3–10.1)
CHLORIDE SERPL-SCNC: 103 MMOL/L (ref 100–108)
CHOLEST SERPL-MCNC: 193 MG/DL (ref 50–200)
CO2 SERPL-SCNC: 27 MMOL/L (ref 21–32)
CREAT SERPL-MCNC: 0.65 MG/DL (ref 0.6–1.3)
EOSINOPHIL # BLD AUTO: 0.16 THOUSAND/ΜL (ref 0–0.61)
EOSINOPHIL NFR BLD AUTO: 2 % (ref 0–6)
ERYTHROCYTE [DISTWIDTH] IN BLOOD BY AUTOMATED COUNT: 12.1 % (ref 11.6–15.1)
GFR SERPL CREATININE-BSD FRML MDRD: 117 ML/MIN/1.73SQ M
GLUCOSE P FAST SERPL-MCNC: 80 MG/DL (ref 65–99)
HCT VFR BLD AUTO: 42.8 % (ref 34.8–46.1)
HDLC SERPL-MCNC: 73 MG/DL
HGB BLD-MCNC: 13.9 G/DL (ref 11.5–15.4)
IMM GRANULOCYTES # BLD AUTO: 0.03 THOUSAND/UL (ref 0–0.2)
IMM GRANULOCYTES NFR BLD AUTO: 0 % (ref 0–2)
LDLC SERPL CALC-MCNC: 97 MG/DL (ref 0–100)
LYMPHOCYTES # BLD AUTO: 2.37 THOUSANDS/ΜL (ref 0.6–4.47)
LYMPHOCYTES NFR BLD AUTO: 30 % (ref 14–44)
MCH RBC QN AUTO: 31 PG (ref 26.8–34.3)
MCHC RBC AUTO-ENTMCNC: 32.5 G/DL (ref 31.4–37.4)
MCV RBC AUTO: 96 FL (ref 82–98)
MONOCYTES # BLD AUTO: 0.62 THOUSAND/ΜL (ref 0.17–1.22)
MONOCYTES NFR BLD AUTO: 8 % (ref 4–12)
NEUTROPHILS # BLD AUTO: 4.62 THOUSANDS/ΜL (ref 1.85–7.62)
NEUTS SEG NFR BLD AUTO: 59 % (ref 43–75)
NONHDLC SERPL-MCNC: 120 MG/DL
NRBC BLD AUTO-RTO: 0 /100 WBCS
PLATELET # BLD AUTO: 342 THOUSANDS/UL (ref 149–390)
PMV BLD AUTO: 10.3 FL (ref 8.9–12.7)
POTASSIUM SERPL-SCNC: 4.3 MMOL/L (ref 3.5–5.3)
PROT SERPL-MCNC: 7.3 G/DL (ref 6.4–8.2)
RBC # BLD AUTO: 4.48 MILLION/UL (ref 3.81–5.12)
SODIUM SERPL-SCNC: 135 MMOL/L (ref 136–145)
TRIGL SERPL-MCNC: 114 MG/DL
TSH SERPL DL<=0.05 MIU/L-ACNC: 0.72 UIU/ML (ref 0.36–3.74)
WBC # BLD AUTO: 7.86 THOUSAND/UL (ref 4.31–10.16)

## 2021-09-13 PROCEDURE — 80053 COMPREHEN METABOLIC PANEL: CPT | Performed by: PHYSICIAN ASSISTANT

## 2021-09-13 PROCEDURE — 36415 COLL VENOUS BLD VENIPUNCTURE: CPT | Performed by: PHYSICIAN ASSISTANT

## 2021-09-13 PROCEDURE — 85025 COMPLETE CBC W/AUTO DIFF WBC: CPT | Performed by: PHYSICIAN ASSISTANT

## 2021-09-13 PROCEDURE — 84443 ASSAY THYROID STIM HORMONE: CPT | Performed by: PHYSICIAN ASSISTANT

## 2021-09-13 PROCEDURE — 80061 LIPID PANEL: CPT | Performed by: PHYSICIAN ASSISTANT

## 2021-09-13 NOTE — TELEPHONE ENCOUNTER
----- Message from Ciera Metz PA-C sent at 9/13/2021 12:37 PM EDT -----    Your blood work is good at this time  There has been a mild increase in your cholesterol from 184 up to 193 but still within normal range below 200  Continue on a low-cholesterol diet  I did send a message through [address]

## 2021-09-15 ENCOUNTER — TELEPHONE (OUTPATIENT)
Dept: FAMILY MEDICINE CLINIC | Facility: CLINIC | Age: 31
End: 2021-09-15

## 2021-09-15 DIAGNOSIS — F43.21 SITUATIONAL DEPRESSION: ICD-10-CM

## 2021-09-15 DIAGNOSIS — F41.9 ANXIETY: ICD-10-CM

## 2021-09-15 RX ORDER — VENLAFAXINE HYDROCHLORIDE 37.5 MG/1
CAPSULE, EXTENDED RELEASE ORAL
Qty: 180 CAPSULE | Refills: 1 | Status: SHIPPED | OUTPATIENT
Start: 2021-09-15 | End: 2021-10-11 | Stop reason: SDUPTHER

## 2021-10-11 ENCOUNTER — OFFICE VISIT (OUTPATIENT)
Dept: FAMILY MEDICINE CLINIC | Facility: CLINIC | Age: 31
End: 2021-10-11
Payer: COMMERCIAL

## 2021-10-11 VITALS
OXYGEN SATURATION: 98 % | DIASTOLIC BLOOD PRESSURE: 70 MMHG | HEIGHT: 58 IN | WEIGHT: 106 LBS | TEMPERATURE: 98.6 F | RESPIRATION RATE: 14 BRPM | BODY MASS INDEX: 22.25 KG/M2 | HEART RATE: 97 BPM | SYSTOLIC BLOOD PRESSURE: 108 MMHG

## 2021-10-11 DIAGNOSIS — Z00.00 WELL ADULT EXAM: Primary | ICD-10-CM

## 2021-10-11 DIAGNOSIS — F43.21 SITUATIONAL DEPRESSION: ICD-10-CM

## 2021-10-11 DIAGNOSIS — E78.1 PRIMARY HYPERTRIGLYCERIDEMIA: ICD-10-CM

## 2021-10-11 DIAGNOSIS — R63.5 RECENT WEIGHT GAIN: ICD-10-CM

## 2021-10-11 DIAGNOSIS — K21.9 GASTROESOPHAGEAL REFLUX DISEASE, UNSPECIFIED WHETHER ESOPHAGITIS PRESENT: ICD-10-CM

## 2021-10-11 DIAGNOSIS — F41.9 ANXIETY: ICD-10-CM

## 2021-10-11 PROCEDURE — 99214 OFFICE O/P EST MOD 30 MIN: CPT | Performed by: PHYSICIAN ASSISTANT

## 2021-10-11 PROCEDURE — 1036F TOBACCO NON-USER: CPT | Performed by: PHYSICIAN ASSISTANT

## 2021-10-11 PROCEDURE — 3008F BODY MASS INDEX DOCD: CPT | Performed by: PHYSICIAN ASSISTANT

## 2021-10-11 PROCEDURE — 99395 PREV VISIT EST AGE 18-39: CPT | Performed by: PHYSICIAN ASSISTANT

## 2021-10-11 RX ORDER — KETOCONAZOLE 20 MG/G
CREAM TOPICAL
COMMUNITY
Start: 2021-09-20

## 2021-10-11 RX ORDER — VENLAFAXINE HYDROCHLORIDE 75 MG/1
CAPSULE, EXTENDED RELEASE ORAL
Qty: 90 CAPSULE | Refills: 1 | Status: SHIPPED | OUTPATIENT
Start: 2021-10-11 | End: 2022-04-11 | Stop reason: SDUPTHER

## 2021-11-18 DIAGNOSIS — K21.9 GASTROESOPHAGEAL REFLUX DISEASE: ICD-10-CM

## 2021-11-18 RX ORDER — FAMOTIDINE 40 MG/5ML
20 POWDER, FOR SUSPENSION ORAL 2 TIMES DAILY
Qty: 150 ML | Refills: 1 | Status: SHIPPED | OUTPATIENT
Start: 2021-11-18 | End: 2021-12-14

## 2021-12-14 DIAGNOSIS — K21.9 GASTROESOPHAGEAL REFLUX DISEASE: ICD-10-CM

## 2021-12-14 RX ORDER — FAMOTIDINE 40 MG/5ML
20 POWDER, FOR SUSPENSION ORAL 2 TIMES DAILY
Qty: 150 ML | Refills: 1 | Status: SHIPPED | OUTPATIENT
Start: 2021-12-14 | End: 2022-01-18

## 2022-01-18 DIAGNOSIS — K21.9 GASTROESOPHAGEAL REFLUX DISEASE: ICD-10-CM

## 2022-01-18 RX ORDER — FAMOTIDINE 40 MG/5ML
20 POWDER, FOR SUSPENSION ORAL 2 TIMES DAILY
Qty: 150 ML | Refills: 1 | Status: SHIPPED | OUTPATIENT
Start: 2022-01-18 | End: 2022-01-24

## 2022-01-24 ENCOUNTER — OFFICE VISIT (OUTPATIENT)
Dept: FAMILY MEDICINE CLINIC | Facility: CLINIC | Age: 32
End: 2022-01-24
Payer: COMMERCIAL

## 2022-01-24 VITALS
DIASTOLIC BLOOD PRESSURE: 80 MMHG | HEART RATE: 101 BPM | OXYGEN SATURATION: 98 % | TEMPERATURE: 99.8 F | BODY MASS INDEX: 23.72 KG/M2 | WEIGHT: 113 LBS | HEIGHT: 58 IN | SYSTOLIC BLOOD PRESSURE: 112 MMHG

## 2022-01-24 DIAGNOSIS — J30.1 SEASONAL ALLERGIC RHINITIS DUE TO POLLEN: ICD-10-CM

## 2022-01-24 DIAGNOSIS — F43.21 SITUATIONAL DEPRESSION: ICD-10-CM

## 2022-01-24 DIAGNOSIS — K21.9 GASTROESOPHAGEAL REFLUX DISEASE, UNSPECIFIED WHETHER ESOPHAGITIS PRESENT: Primary | ICD-10-CM

## 2022-01-24 DIAGNOSIS — F41.9 ANXIETY: ICD-10-CM

## 2022-01-24 DIAGNOSIS — F33.9 RECURRENT DEPRESSION (HCC): ICD-10-CM

## 2022-01-24 PROCEDURE — 3008F BODY MASS INDEX DOCD: CPT | Performed by: PHYSICIAN ASSISTANT

## 2022-01-24 PROCEDURE — 1036F TOBACCO NON-USER: CPT | Performed by: PHYSICIAN ASSISTANT

## 2022-01-24 PROCEDURE — 99214 OFFICE O/P EST MOD 30 MIN: CPT | Performed by: PHYSICIAN ASSISTANT

## 2022-01-24 RX ORDER — FLUTICASONE PROPIONATE 50 MCG
1 SPRAY, SUSPENSION (ML) NASAL DAILY
Qty: 16 ML | Refills: 5 | Status: SHIPPED | OUTPATIENT
Start: 2022-01-24 | End: 2022-07-08

## 2022-01-24 RX ORDER — FAMOTIDINE 20 MG/1
20 TABLET, FILM COATED ORAL 2 TIMES DAILY
Qty: 180 TABLET | Refills: 1 | Status: SHIPPED | OUTPATIENT
Start: 2022-01-24

## 2022-01-24 NOTE — PROGRESS NOTES
Assessment/Plan:    -since the Famotidine suspension is no longer available I did recommend she crush the Famotidine tablet 20 mg twice daily  -restart Flonase as directed  -continue on the Effexor 75 mg daily  -encouraged to get the COVID vaccine  -recommend follow-up in 5 months, sooner if any symptoms increase     M*Modal software was used to dictate this note  It may contain errors with dictating incorrect words/spelling  Please contact provider directly for any questions  Diagnoses and all orders for this visit:    Gastroesophageal reflux disease, unspecified whether esophagitis present  -     famotidine (PEPCID) 20 mg tablet; Take 1 tablet (20 mg total) by mouth 2 (two) times a day    Situational depression    Seasonal allergic rhinitis due to pollen  -     fluticasone (FLONASE) 50 mcg/act nasal spray; 1 spray into each nostril daily    Recurrent depression (HCC)    Anxiety          Subjective:      Patient ID: Paramjit Beyer is a 33 y o  female  Patient presents today for follow-up of depression/anxiety and reflux  She states that she has been doing well on her medications  She has not run out of the Famotidine suspension  The pharmacy is no longer able to get this  She states that she did try swallowing a Famotidine tablet in the past but was unsuccessful  Reflux has been well controlled  She states that over the last several weeks she has noticed a postnasal drip  She is constantly clearing her throat  Over the last several days she has noticed some irritation of her throat  No known sick contacts  She has not been vaccinated with COVID  Denies any fevers or chills  She does have a history of allergies and she has noticed some improvement with Allegra daily  The following portions of the patient's history were reviewed and updated as appropriate:   She  has a past medical history of Anxiety, GERD (gastroesophageal reflux disease), and IUD (intrauterine device) in place    She Patient Active Problem List    Diagnosis Date Noted    Recurrent depression ([facility] [address] ) 01/24/2022    Recent weight gain 10/11/2021    Other fatigue 08/30/2021    Mid back pain 08/30/2021    Palpitations 03/15/2021    Post-viral cough syndrome 03/15/2021    Primary hypertriglyceridemia 03/09/2021    BV (bacterial vaginosis) 12/10/2020    Encounter for gynecological examination with abnormal finding 05/28/2020    Screening for venereal disease 05/28/2020    Situational depression 05/21/2020    Well adult exam 01/21/2019    Brain cyst 01/21/2019    Flu vaccine need 01/21/2019    Anxiety 01/21/2019    Pap smear abnormality of cervix with LGSIL 12/28/2015    Gastroesophageal reflux disease 01/28/2011    Allergic rhinitis 06/09/1999     She  has no past surgical history on file  Her family history includes Diabetes in her father, paternal grandfather, and paternal grandmother; Hypertension in her mother; Rectal cancer in her paternal grandmother  She  reports that she has never smoked  She has never used smokeless tobacco  She reports current alcohol use  She reports that she does not use drugs  Current Outpatient Medications   Medication Sig Dispense Refill    cetirizine (ZyrTEC) 10 mg tablet Take 10 mg by mouth daily      fluticasone (FLONASE) 50 mcg/act nasal spray 1 spray into each nostril daily 16 mL 5    ketoconazole (NIZORAL) 2 % cream APPLY TO FEET AND AROUND TOENAILS TWICE DAILY UNTIL CLEAR      LORazepam (ATIVAN) 0 5 mg tablet Take 1 tablet (0 5 mg total) by mouth every 8 (eight) hours as needed for anxiety 30 tablet 0    Norethin-Eth Estradiol-Fe 0 4-35 MG-MCG CHEW Chew 1 tablet      venlafaxine (EFFEXOR-XR) 75 mg 24 hr capsule Take 1 tabs daily with breakfast 90 capsule 1    famotidine (PEPCID) 20 mg tablet Take 1 tablet (20 mg total) by mouth 2 (two) times a day 180 tablet 1    nystatin-triamcinolone (MYCOLOG-II) cream APPLY TOPICALLY 4 TIMES A DAY   (Patient not taking: Reported on 8/30/2021)       No current facility-administered medications for this visit  Current Outpatient Medications on File Prior to Visit   Medication Sig    cetirizine (ZyrTEC) 10 mg tablet Take 10 mg by mouth daily    ketoconazole (NIZORAL) 2 % cream APPLY TO FEET AND AROUND TOENAILS TWICE DAILY UNTIL CLEAR    LORazepam (ATIVAN) 0 5 mg tablet Take 1 tablet (0 5 mg total) by mouth every 8 (eight) hours as needed for anxiety    Norethin-Eth Estradiol-Fe 0 4-35 MG-MCG CHEW Chew 1 tablet    venlafaxine (EFFEXOR-XR) 75 mg 24 hr capsule Take 1 tabs daily with breakfast    [DISCONTINUED] famotidine (PEPCID) 20 mg/2 5 mL oral suspension TAKE 2 5 ML (20 MG TOTAL) BY MOUTH 2 (TWO) TIMES A DAY    [DISCONTINUED] fluticasone (FLONASE) 50 mcg/act nasal spray SPRAY 1 SPRAY INTO EACH NOSTRIL EVERY DAY    nystatin-triamcinolone (MYCOLOG-II) cream APPLY TOPICALLY 4 TIMES A DAY  (Patient not taking: Reported on 8/30/2021)     No current facility-administered medications on file prior to visit  She is allergic to albumen, egg - food allergy and prednisone       Review of Systems   Constitutional: Negative for chills and fever  HENT: Positive for postnasal drip and sore throat  Negative for congestion  Respiratory: Negative for cough and shortness of breath  Gastrointestinal: Negative for abdominal pain  Psychiatric/Behavioral:        As stated in HPI         Objective:      /80 (BP Location: Left arm, Patient Position: Sitting, Cuff Size: Standard)   Pulse 101   Temp 99 8 °F (37 7 °C) (Tympanic)   Ht 4' 10" (1 473 m)   Wt 51 3 kg (113 lb)   SpO2 98%   BMI 23 62 kg/m²          Physical Exam  Vitals reviewed  Constitutional:       General: She is not in acute distress  Appearance: Normal appearance  She is well-developed  She is not ill-appearing, toxic-appearing or diaphoretic  HENT:      Head: Normocephalic and atraumatic        Right Ear: Tympanic membrane, ear canal and external ear normal  Left Ear: Tympanic membrane, ear canal and external ear normal       Nose: No congestion or rhinorrhea  Mouth/Throat:      Mouth: Mucous membranes are moist       Pharynx: No oropharyngeal exudate or posterior oropharyngeal erythema  Neck:      Thyroid: No thyromegaly  Cardiovascular:      Rate and Rhythm: Normal rate and regular rhythm  Heart sounds: Normal heart sounds  No murmur heard  No friction rub  No gallop  Pulmonary:      Effort: Pulmonary effort is normal  No respiratory distress  Breath sounds: Normal breath sounds  No wheezing, rhonchi or rales  Abdominal:      General: Bowel sounds are normal       Palpations: Abdomen is soft  There is no mass  Tenderness: There is no abdominal tenderness  Musculoskeletal:         General: No deformity  Cervical back: Neck supple  Right lower leg: No edema  Left lower leg: No edema  Lymphadenopathy:      Cervical: No cervical adenopathy  Skin:     General: Skin is warm  Neurological:      General: No focal deficit present  Mental Status: She is alert  Psychiatric:         Mood and Affect: Mood normal          Behavior: Behavior normal          Thought Content:  Thought content normal          Judgment: Judgment normal

## 2022-04-11 DIAGNOSIS — F41.9 ANXIETY: ICD-10-CM

## 2022-04-11 DIAGNOSIS — F43.21 SITUATIONAL DEPRESSION: ICD-10-CM

## 2022-04-11 RX ORDER — VENLAFAXINE HYDROCHLORIDE 75 MG/1
CAPSULE, EXTENDED RELEASE ORAL
Qty: 90 CAPSULE | Refills: 0 | Status: SHIPPED | OUTPATIENT
Start: 2022-04-11 | End: 2022-06-22 | Stop reason: SDUPTHER

## 2022-06-22 ENCOUNTER — OFFICE VISIT (OUTPATIENT)
Dept: FAMILY MEDICINE CLINIC | Facility: CLINIC | Age: 32
End: 2022-06-22
Payer: COMMERCIAL

## 2022-06-22 VITALS
TEMPERATURE: 98.9 F | SYSTOLIC BLOOD PRESSURE: 118 MMHG | RESPIRATION RATE: 16 BRPM | DIASTOLIC BLOOD PRESSURE: 70 MMHG | WEIGHT: 122.4 LBS | HEIGHT: 58 IN | HEART RATE: 104 BPM | BODY MASS INDEX: 25.69 KG/M2

## 2022-06-22 DIAGNOSIS — M25.562 ACUTE PAIN OF LEFT KNEE: ICD-10-CM

## 2022-06-22 DIAGNOSIS — Z00.00 HEALTHCARE MAINTENANCE: ICD-10-CM

## 2022-06-22 DIAGNOSIS — R63.5 WEIGHT GAIN: ICD-10-CM

## 2022-06-22 DIAGNOSIS — K21.9 GASTROESOPHAGEAL REFLUX DISEASE, UNSPECIFIED WHETHER ESOPHAGITIS PRESENT: ICD-10-CM

## 2022-06-22 DIAGNOSIS — F41.9 ANXIETY: Primary | ICD-10-CM

## 2022-06-22 DIAGNOSIS — F43.21 SITUATIONAL DEPRESSION: ICD-10-CM

## 2022-06-22 PROCEDURE — 99214 OFFICE O/P EST MOD 30 MIN: CPT | Performed by: NURSE PRACTITIONER

## 2022-06-22 PROCEDURE — 3008F BODY MASS INDEX DOCD: CPT | Performed by: NURSE PRACTITIONER

## 2022-06-22 PROCEDURE — 1036F TOBACCO NON-USER: CPT | Performed by: NURSE PRACTITIONER

## 2022-06-22 RX ORDER — NAPROXEN 500 MG/1
500 TABLET ORAL 2 TIMES DAILY WITH MEALS
Qty: 60 TABLET | Refills: 0 | Status: SHIPPED | OUTPATIENT
Start: 2022-06-22

## 2022-06-22 RX ORDER — VENLAFAXINE HYDROCHLORIDE 75 MG/1
CAPSULE, EXTENDED RELEASE ORAL
Qty: 90 CAPSULE | Refills: 1 | Status: SHIPPED | OUTPATIENT
Start: 2022-06-22

## 2022-06-22 NOTE — ASSESSMENT & PLAN NOTE
- Well controlled with use of Ativan as needed  Also on Effexor-XR  Patient reports doing well  Continue same medications  - Will continue to monitor

## 2022-06-22 NOTE — PROGRESS NOTES
Assessment/Plan:    Gastroesophageal reflux disease  - Well controlled on Pepcid daily  Continue same    - Avoid triggering food and beverage    - Will continue to monitor  Anxiety  - Well controlled with use of Ativan as needed  Also on Effexor-XR  Patient reports doing well  Continue same medications  - Will continue to monitor  Situational depression  - Well controlled on Effexor daily  Continue same    - Will continue to monitor  Weight gain  - Possibly related to Effexor? Dose was increased last October which is when she started to gain weight    - Discussed healthy diet and regular exercise  - Patient requested referral to Dietician  - Will continue to monitor  Acute pain of left knee  - Prescription sent for Naproxen twice daily  Advised of side effects   - Rest, ice, and elevated  - If no improvement, contact office  Diagnoses and all orders for this visit:    Anxiety  -     venlafaxine (EFFEXOR-XR) 75 mg 24 hr capsule; Take 1 tabs daily with breakfast    Healthcare maintenance  -     CBC and differential; Future  -     Comprehensive metabolic panel; Future  -     Lipid Panel with Direct LDL reflex; Future  -     TSH, 3rd generation with Free T4 reflex; Future    Situational depression  -     venlafaxine (EFFEXOR-XR) 75 mg 24 hr capsule; Take 1 tabs daily with breakfast    Weight gain  -     Cancel: Ambulatory Referral to Nutrition Services; Future  -     Ambulatory Referral to Nutrition Services; Future    Acute pain of left knee  -     naproxen (NAPROSYN) 500 mg tablet; Take 1 tablet (500 mg total) by mouth 2 (two) times a day with meals    Gastroesophageal reflux disease, unspecified whether esophagitis present        Subjective:      Patient ID: Laura Collado is a 27 y o  female  Patient with past medical history of GERD, anxiety, and depression presents today for follow up  She is taking her prescribed medications and reports no side effects   She has complaints today of left knee pain that has been occurring for the past month  She thinks it happened when she was jumping on a trampoline with her son  She has taken over the counter pain relievers at times with no improvement  She also has complaints today of weight gain  She has gained about 16 pounds since last October  The following portions of the patient's history were reviewed and updated as appropriate: allergies, current medications, past family history, past medical history, past social history, past surgical history and problem list     Review of Systems   Constitutional: Positive for unexpected weight change (weight gain)  Negative for fatigue and fever  HENT: Negative for trouble swallowing  Eyes: Negative for visual disturbance  Respiratory: Negative for cough and shortness of breath  Cardiovascular: Negative for chest pain and palpitations  Gastrointestinal: Negative for abdominal pain and blood in stool  Endocrine: Negative for cold intolerance and heat intolerance  Genitourinary: Negative for difficulty urinating and dysuria  Musculoskeletal: Positive for arthralgias (left knee pain)  Negative for gait problem  Skin: Negative for rash  Neurological: Negative for dizziness, syncope and headaches  Hematological: Negative for adenopathy  Psychiatric/Behavioral: Negative for behavioral problems  Objective:      /70 (BP Location: Left arm, Patient Position: Sitting, Cuff Size: Standard)   Pulse 104   Temp 98 9 °F (37 2 °C) (Tympanic)   Resp 16   Ht 4' 10" (1 473 m)   Wt 55 5 kg (122 lb 6 4 oz)   BMI 25 58 kg/m²          Physical Exam  Vitals and nursing note reviewed  Constitutional:       General: She is not in acute distress  Appearance: Normal appearance  HENT:      Head: Normocephalic and atraumatic        Right Ear: Tympanic membrane, ear canal and external ear normal       Left Ear: Tympanic membrane, ear canal and external ear normal    Eyes: Conjunctiva/sclera: Conjunctivae normal    Cardiovascular:      Rate and Rhythm: Normal rate and regular rhythm  Heart sounds: Normal heart sounds  Pulmonary:      Effort: Pulmonary effort is normal       Breath sounds: Normal breath sounds  Musculoskeletal:         General: Normal range of motion  Cervical back: Normal range of motion  Left knee: No swelling, bony tenderness or crepitus  Normal range of motion  No tenderness  Lymphadenopathy:      Cervical: No cervical adenopathy  Skin:     General: Skin is warm and dry  Neurological:      Mental Status: She is alert and oriented to person, place, and time  Cranial Nerves: No cranial nerve deficit  Psychiatric:         Mood and Affect: Mood normal          Behavior: Behavior normal        BMI Counseling: Body mass index is 25 58 kg/m²  The BMI is above normal  Nutrition recommendations include decreasing portion sizes, encouraging healthy choices of fruits and vegetables, decreasing fast food intake and reducing intake of cholesterol  Exercise recommendations include moderate physical activity 150 minutes/week and exercising 3-5 times per week  Rationale for BMI follow-up plan is due to patient being overweight or obese

## 2022-06-22 NOTE — ASSESSMENT & PLAN NOTE
- Well controlled on Pepcid daily  Continue same    - Avoid triggering food and beverage    - Will continue to monitor

## 2022-06-22 NOTE — ASSESSMENT & PLAN NOTE
- Prescription sent for Naproxen twice daily  Advised of side effects   - Rest, ice, and elevated  - If no improvement, contact office

## 2022-06-22 NOTE — ASSESSMENT & PLAN NOTE
- Possibly related to Effexor? Dose was increased last October which is when she started to gain weight    - Discussed healthy diet and regular exercise  - Patient requested referral to Dietician  - Will continue to monitor

## 2022-08-30 ENCOUNTER — TELEPHONE (OUTPATIENT)
Dept: FAMILY MEDICINE CLINIC | Facility: CLINIC | Age: 32
End: 2022-08-30

## 2022-08-30 NOTE — TELEPHONE ENCOUNTER
Received fax from [facility] requesting additional dx codes for CBC labs      Additional codes given; f41 9,f43 21,r63 5,m25 562,k21 9

## 2022-10-11 PROBLEM — Z00.00 HEALTHCARE MAINTENANCE: Status: RESOLVED | Noted: 2020-05-28 | Resolved: 2022-10-11

## 2022-11-22 ENCOUNTER — OFFICE VISIT (OUTPATIENT)
Dept: FAMILY MEDICINE CLINIC | Facility: CLINIC | Age: 32
End: 2022-11-22

## 2022-11-22 VITALS
WEIGHT: 127.2 LBS | BODY MASS INDEX: 26.7 KG/M2 | RESPIRATION RATE: 14 BRPM | OXYGEN SATURATION: 96 % | TEMPERATURE: 97.8 F | DIASTOLIC BLOOD PRESSURE: 80 MMHG | HEART RATE: 96 BPM | SYSTOLIC BLOOD PRESSURE: 122 MMHG | HEIGHT: 58 IN

## 2022-11-22 DIAGNOSIS — R05.1 ACUTE COUGH: ICD-10-CM

## 2022-11-22 DIAGNOSIS — F43.21 SITUATIONAL DEPRESSION: ICD-10-CM

## 2022-11-22 DIAGNOSIS — J01.00 ACUTE NON-RECURRENT MAXILLARY SINUSITIS: Primary | ICD-10-CM

## 2022-11-22 RX ORDER — PROMETHAZINE HYDROCHLORIDE AND CODEINE PHOSPHATE 6.25; 1 MG/5ML; MG/5ML
5 SYRUP ORAL EVERY 4 HOURS PRN
Qty: 180 ML | Refills: 0 | Status: SHIPPED | OUTPATIENT
Start: 2022-11-22 | End: 2022-11-23 | Stop reason: SDUPTHER

## 2022-11-22 RX ORDER — FLUTICASONE PROPIONATE 50 MCG
1 SPRAY, SUSPENSION (ML) NASAL DAILY
Qty: 16 G | Refills: 0 | Status: SHIPPED | OUTPATIENT
Start: 2022-11-22

## 2022-11-22 RX ORDER — AMOXICILLIN 500 MG/1
500 CAPSULE ORAL EVERY 12 HOURS SCHEDULED
Qty: 14 CAPSULE | Refills: 0 | Status: SHIPPED | OUTPATIENT
Start: 2022-11-22 | End: 2022-11-29

## 2022-11-22 NOTE — LETTER
November 22, 2022     Patient: Esperanza Vasquez  YOB: 1990  Date of Visit: 11/22/2022      To Whom it May Concern:    Esperanza Vasquez is under my professional care  Paige Rivera was seen in my office on 11/22/2022  Phyllistine Miguel may return to work on 11/23/2022  If you have any questions or concerns, please don't hesitate to call           Sincerely,          Eldon Tsang, JAIDA        CC: No Recipients

## 2022-11-22 NOTE — PROGRESS NOTES
Name: Elvin Gomez      : 1990      MRN: [id number]  Encounter Provider: JAIDA Maxwell  Encounter Date: 2022   Encounter department: [address] [place] [address]  Acute non-recurrent maxillary sinusitis  Assessment & Plan:  - Prescription sent for Amoxicillin and Flonase  Discussed side effects   - Increase oral hydration and use humidifier   - Contact office if symptoms do not improve  Orders:  -     amoxicillin (AMOXIL) 500 mg capsule; Take 1 capsule (500 mg total) by mouth every 12 (twelve) hours for 7 days  -     fluticasone (FLONASE) 50 mcg/act nasal spray; 1 spray into each nostril daily    2  Acute cough  Assessment & Plan:  - Prescription sent for phenergan with codeine cough syrup  Discussed side effects  Do not take if working   - Increase oral hydration and use humidifier   - Contact office if cough does not improve  Orders:  -     promethazine-codeine (PHENERGAN WITH CODEINE) 6 25-10 mg/5 mL syrup; Take 5 mL by mouth every 4 (four) hours as needed for cough    3  Situational depression  Assessment & Plan:  - Well controlled on Effexor daily  Continue same   - Will continue to monitor  Subjective     Patient presents today with complaints of sore throat, post nasal drip, sinus pressure and cough for 7 days  She hasn't been able to sleep due to her coughing  She has been taking OTC Theraflu day and night with only mild improvement  Review of Systems   Constitutional: Negative for fatigue and fever  HENT: Positive for congestion, postnasal drip, sinus pressure and sinus pain  Negative for trouble swallowing  Eyes: Negative for visual disturbance  Respiratory: Positive for cough  Negative for shortness of breath  Cardiovascular: Negative for chest pain and palpitations  Gastrointestinal: Negative for abdominal pain and blood in stool  Endocrine: Negative for cold intolerance and heat intolerance  Genitourinary: Negative for difficulty urinating and dysuria  Musculoskeletal: Negative for gait problem  Skin: Negative for rash  Neurological: Negative for dizziness, syncope and headaches  Hematological: Negative for adenopathy  Psychiatric/Behavioral: Negative for behavioral problems  Nayely Garcia, IrisF  Contact the [facility] [address]    YOB: 1990   Recent Address:  [address] [place], [place] [address]   Status of States Queried:  View Details   Status of States [place] Records (1)    Report Criteria  Linked Records  ? One or more patients do not exactly match the search patient demographic information that was sent during search: Judie Easley, 1990  The report displayed is for the next best possible match  Please verify the report is for the intended patient before proceeding    Status of States Queried                        Tile cannot be moved due to a restriction by the [facility] [employer]  Total Prescriptions  3    Total Private Pay  0    Total Prescribers  1    Total Pharmacies  1    Opioids* (excluding Buprenorphine)  Current Qty  0    Current MME/day  0 00    30 Day Avg MME/day  0 00    Buprenorphine*  Current Qty  0    Current mg/day  0 00    30 Day Avg mg/day  0 00        Tile cannot be moved due to a restriction by the Admin  Prescriptions   Total: 3   Private Pay: 0   Showing 1-3 of 3 Items   View   15 Items    1 of 1   Filled  Written  Sold  ID  Drug  QTY  Days  Prescriber  RX #  Dispenser  Refill  Daily Dose*  Pymt Type       05/24/2021 05/24/2021   1  Lorazepam 0 5 Mg Tablet   30 00  10  Ta Fel  [id number]   Pen (3387)   1 50 LME  [place]     04/12/2021 04/12/2021   1  Lorazepam 0 5 Mg Tablet   30 00  10  Ta Fel  [id number]   Pen (3387)   1 50 LME  [place]     03/15/2021  03/15/2021   1  Lorazepam 0 5 Mg Tablet   30 00  10  Ta Fel  [id number]   Pen (3387)   1 50 LME  [place] Disclaimer   Disclaimer  Showing 1-3 of 3 Items   View   15 Items    1 of 1     Tile cannot be moved due to a restriction by the Admin  Providers   Total: 1   Showing 1 Item   View   15 Items    1 of 1   Name  [address]  Phone     [facility] [address]   [place]  [place]  [address]  -    Showing 1 Item   View   15 Items    1 of 1     Tile cannot be moved due to a restriction by the [employer]   Total: 1   Showing 1 Item   View   15 Items    1 of 1   Name  [address]  Phone     [address]  ([phone])  [facility] 206 1 Item   View   15 Items    1 of 1       Tile View Settings   This website uses [employer], a web analytics service provided by [employer],[facility]")  [employer] uses "cookies", which are text files placed on your computer, to help the website analyze how users use the site  The information generated by the cookie about your use of the website will be transmitted to and stored by [employer] on servers in the [place]  In case IP-anonymization is activated on this website, your IP address will be truncated within the area of Member States of the [employer] or other parties to the Agreement on the Janice  Only in exceptional cases the whole IP address will be first transferred to a [facility] in the [facility] and truncated there  The IP-anonymization is active on this website  Google will use this information on behalf of the  of this website for the purpose of evaluating your use of the website, compiling reports on website activity for website operators and providing them other services relating to website activity and internet usage  The IP-address, that your Browser conveys within the scope of [employer], will not be associated with any other data held by [employer]   You may refuse the use of cookies by selecting the appropriate settings on your browser, however please note that if you do this you may not be able to use the full functionality of this website  You can also opt-out from being tracked by [employer] with effect for the future by downloading and installing [employer] Opt-out Browser Addon for your current web browser: [email or web address]        Past Medical History:   Diagnosis Date   • Anxiety    • GERD (gastroesophageal reflux disease)    • IUD (intrauterine device) in place      History reviewed  No pertinent surgical history  Family History   Problem Relation Age of Onset   • Hypertension Mother    • Diabetes Father    • Diabetes Paternal Grandmother    • Rectal cancer Paternal Grandmother    • Diabetes Paternal Grandfather      Social History     Socioeconomic History   • Marital status: /Civil Union     Spouse name: None   • Number of children: None   • Years of education: None   • Highest education level: None   Occupational History   • None   Tobacco Use   • Smoking status: Never   • Smokeless tobacco: Never   • Tobacco comments:     no/never passive smoke exposure   Vaping Use   • Vaping Use: Never used   Substance and Sexual Activity   • Alcohol use:  Yes   • Drug use: No   • Sexual activity: Yes     Partners: Male   Other Topics Concern   • None   Social History Narrative   • None     Social Determinants of Health     Financial Resource Strain: Not on file   Food Insecurity: Not on file   Transportation Needs: Not on file   Physical Activity: Not on file   Stress: Not on file   Social Connections: Not on file   Intimate Partner Violence: Not on file   Housing Stability: Not on file     Current Outpatient Medications on File Prior to Visit   Medication Sig   • famotidine (PEPCID) 20 mg tablet Take 1 tablet (20 mg total) by mouth 2 (two) times a day   • fluticasone (FLONASE) 50 mcg/act nasal spray SPRAY 1 SPRAY INTO EACH NOSTRIL EVERY DAY   • LORazepam (ATIVAN) 0 5 mg tablet Take 1 tablet (0 5 mg total) by mouth every 8 (eight) hours as needed for anxiety   • Norethin-Eth Estradiol-Fe 0 4-35 MG-MCG CHEW Chew 1 tablet   • venlafaxine (EFFEXOR-XR) 75 mg 24 hr capsule Take 1 tabs daily with breakfast   • cetirizine (ZyrTEC) 10 mg tablet Take 10 mg by mouth daily (Patient not taking: Reported on 11/22/2022)   • ketoconazole (NIZORAL) 2 % cream APPLY TO FEET AND AROUND TOENAILS TWICE DAILY UNTIL CLEAR (Patient not taking: Reported on 6/22/2022)   • naproxen (NAPROSYN) 500 mg tablet Take 1 tablet (500 mg total) by mouth 2 (two) times a day with meals (Patient not taking: Reported on 11/22/2022)   • nystatin-triamcinolone (MYCOLOG-II) cream APPLY TOPICALLY 4 TIMES A DAY  (Patient not taking: No sig reported)     Allergies   Allergen Reactions   • Prednisone Shortness Of Breath   • Albumen, Egg - Food Allergy      Immunization History   Administered Date(s) Administered   • DTaP 5 1990, [birth year], 10/17/1989, 04/12/1993   • Hep B, Adolescent or Pediatric 05/02/1992, 06/03/1992, 04/12/1993   • IPV 1990, 1990, 1990, 10/17/1989   • MMR 07/13/1989, 05/02/1992   • Td (adult), adsorbed 05/21/2001   • Tdap 04/23/2006, 05/09/2013       Objective     /80 (BP Location: Left arm, Patient Position: Sitting, Cuff Size: Adult)   Pulse 96   Temp 97 8 °F (36 6 °C) (Tympanic)   Resp 14   Ht 4' 10" (1 473 m)   Wt 57 7 kg (127 lb 3 2 oz)   SpO2 96%   BMI 26 58 kg/m²     Physical Exam  Vitals and nursing note reviewed  Constitutional:       General: She is not in acute distress  Appearance: Normal appearance  HENT:      Head: Normocephalic and atraumatic  Right Ear: Tympanic membrane, ear canal and external ear normal       Left Ear: Tympanic membrane, ear canal and external ear normal       Nose: Congestion present  Right Sinus: Maxillary sinus tenderness present  Left Sinus: Maxillary sinus tenderness present     Eyes: Conjunctiva/sclera: Conjunctivae normal    Cardiovascular:      Rate and Rhythm: Normal rate and regular rhythm  Heart sounds: Normal heart sounds  Pulmonary:      Effort: Pulmonary effort is normal       Breath sounds: Normal breath sounds  Musculoskeletal:         General: Normal range of motion  Cervical back: Normal range of motion  Lymphadenopathy:      Cervical: No cervical adenopathy  Skin:     General: Skin is warm and dry  Neurological:      Mental Status: She is alert and oriented to person, place, and time  Cranial Nerves: No cranial nerve deficit     Psychiatric:         Mood and Affect: Mood normal          Behavior: Behavior normal        JAIDA Hui

## 2022-11-23 DIAGNOSIS — R05.1 ACUTE COUGH: ICD-10-CM

## 2022-11-23 PROBLEM — J01.00 ACUTE NON-RECURRENT MAXILLARY SINUSITIS: Status: ACTIVE | Noted: 2022-11-23

## 2022-11-23 RX ORDER — PROMETHAZINE HYDROCHLORIDE AND CODEINE PHOSPHATE 6.25; 1 MG/5ML; MG/5ML
5 SYRUP ORAL EVERY 4 HOURS PRN
Qty: 180 ML | Refills: 0 | Status: SHIPPED | OUTPATIENT
Start: 2022-11-23

## 2022-11-23 NOTE — TELEPHONE ENCOUNTER
Message forward to provider  pmed; Patient Prescription Report    Patients    Select Patient Id Name D O B  R [address] 106   [] Vidal Adames [facility]- [id number] Jacquie CASTILLO           Search Criteria    Name Date of Birth Date Range   Barrie Cockayne 68- 11- To 11-     Requester Name Requested Date   Calvin Peterson Wed Nov 23 2022 10:55:30 GMT-0500 [place] Standard Time)     Summary   Prescriptions  3  Prescribers  1  Pharmacies  1  View Map  Drug Classes   Benzodiazepines  3  Stimulants  0  Opioids  0  Muscle Relaxants  0  Opioid Dosage   Total MME for Active Prescriptions  0    Average MME  0 00  MME Graph   MME Calculator     • Prescriptions  • Notifications    • Prescribers  • Pharmacies  • MME Graph    [] PA   Drug Categories:      [] Benzodiazepines       Show  entries  Search:  Patient Id Prescription #  Filled Written Drug Label Qty Days Strength MME** Prescriber Pharmacy Payment REFILL #/Auth State Detail   1  [id number] 05/24/2021 05/24/2021 LORazepam (Tablet)  30 0 10 0 5 MG NA [facility] 00 / 0 [address]    1  [id number] 04/12/2021 04/12/2021 LORazepam (Tablet)  30 0 10 0 5 MG NA [facility] 00 / 0 [address]    1  [id number] 03/15/2021  03/15/2021 LORazepam (Tablet)

## 2022-11-23 NOTE — ASSESSMENT & PLAN NOTE
- Prescription sent for Amoxicillin and Flonase  Discussed side effects   - Increase oral hydration and use humidifier   - Contact office if symptoms do not improve

## 2022-11-23 NOTE — ASSESSMENT & PLAN NOTE
- Prescription sent for phenergan with codeine cough syrup  Discussed side effects  Do not take if working   - Increase oral hydration and use humidifier   - Contact office if cough does not improve

## 2022-11-25 ENCOUNTER — TELEPHONE (OUTPATIENT)
Dept: FAMILY MEDICINE CLINIC | Facility: CLINIC | Age: 32
End: 2022-11-25

## 2022-11-25 NOTE — TELEPHONE ENCOUNTER
Patient left a message Wednesday 11/23 who said that she has checked several pharmacies and no one has Promethazine with codeine    We did receive a notice from the pharmacy for a change  Reminder from patient that it has to be a liquid since she cannot swallow pills

## 2022-12-21 ENCOUNTER — OFFICE VISIT (OUTPATIENT)
Dept: FAMILY MEDICINE CLINIC | Facility: CLINIC | Age: 32
End: 2022-12-21

## 2022-12-21 ENCOUNTER — PATIENT MESSAGE (OUTPATIENT)
Dept: FAMILY MEDICINE CLINIC | Facility: CLINIC | Age: 32
End: 2022-12-21

## 2022-12-21 VITALS
DIASTOLIC BLOOD PRESSURE: 80 MMHG | HEART RATE: 101 BPM | HEIGHT: 58 IN | BODY MASS INDEX: 26.57 KG/M2 | SYSTOLIC BLOOD PRESSURE: 128 MMHG | OXYGEN SATURATION: 98 % | WEIGHT: 126.6 LBS | TEMPERATURE: 98.5 F

## 2022-12-21 DIAGNOSIS — K21.9 GASTROESOPHAGEAL REFLUX DISEASE, UNSPECIFIED WHETHER ESOPHAGITIS PRESENT: ICD-10-CM

## 2022-12-21 DIAGNOSIS — F43.21 SITUATIONAL DEPRESSION: ICD-10-CM

## 2022-12-21 DIAGNOSIS — Z00.00 HEALTHCARE MAINTENANCE: ICD-10-CM

## 2022-12-21 DIAGNOSIS — F41.9 ANXIETY: ICD-10-CM

## 2022-12-21 DIAGNOSIS — R68.84 JAW PAIN: Primary | ICD-10-CM

## 2022-12-21 DIAGNOSIS — R68.84 JAW PAIN: ICD-10-CM

## 2022-12-21 DIAGNOSIS — J30.1 SEASONAL ALLERGIC RHINITIS DUE TO POLLEN: ICD-10-CM

## 2022-12-21 RX ORDER — CYCLOBENZAPRINE HCL 5 MG
5 TABLET ORAL
Qty: 30 TABLET | Refills: 0 | Status: SHIPPED | OUTPATIENT
Start: 2022-12-21 | End: 2022-12-22 | Stop reason: SDUPTHER

## 2022-12-21 RX ORDER — CYCLOBENZAPRINE HCL 5 MG
5 TABLET ORAL
Qty: 30 TABLET | Refills: 0 | Status: SHIPPED | OUTPATIENT
Start: 2022-12-21 | End: 2022-12-21

## 2022-12-21 RX ORDER — VENLAFAXINE HYDROCHLORIDE 75 MG/1
CAPSULE, EXTENDED RELEASE ORAL
Qty: 90 CAPSULE | Refills: 1 | Status: SHIPPED | OUTPATIENT
Start: 2022-12-21

## 2022-12-21 RX ORDER — FAMOTIDINE 20 MG/1
20 TABLET, FILM COATED ORAL 2 TIMES DAILY
Qty: 180 TABLET | Refills: 1 | Status: SHIPPED | OUTPATIENT
Start: 2022-12-21

## 2022-12-21 NOTE — ASSESSMENT & PLAN NOTE
- Well controlled with Pepcid  Continue same  Refill sent  - Avoid triggering food and beverage    - Will continue to monitor

## 2022-12-21 NOTE — ASSESSMENT & PLAN NOTE
- Not well controlled  - Can continue OTC Motrin  - Flexeril 5 mg at bedtime    - Contact office if no improvement

## 2022-12-21 NOTE — ASSESSMENT & PLAN NOTE
- Stable  Reports infrequent use of lorazepam    - Continue Effexor daily  - Will continue to monitor

## 2022-12-21 NOTE — ASSESSMENT & PLAN NOTE
- Reviewed immunizations and screenings  - Declines flu shot today  - UTD with dental, vision, and GYN exams

## 2022-12-21 NOTE — PROGRESS NOTES
Name: Mike Quinones      : 1990      MRN: [id number]  Encounter Provider: JAIDA Kelly  Encounter Date: 2022   Encounter department: [address] [place] [address]  Jaw pain  Assessment & Plan:  - Not well controlled  - Can continue OTC Motrin  - Flexeril 5 mg at bedtime    - Contact office if no improvement  Orders:  -     cyclobenzaprine (FLEXERIL) 5 mg tablet; Take 1 tablet (5 mg total) by mouth daily at bedtime as needed for muscle spasms    2  Anxiety  Assessment & Plan:  - Stable  Reports infrequent use of lorazepam    - Continue Effexor daily  - Will continue to monitor  Orders:  -     venlafaxine (EFFEXOR-XR) 75 mg 24 hr capsule; Take 1 tabs daily with breakfast    3  Situational depression  Assessment & Plan:  - Stable on Effexor daily  Continue same   - Will continue to monitor  Orders:  -     venlafaxine (EFFEXOR-XR) 75 mg 24 hr capsule; Take 1 tabs daily with breakfast    4  Gastroesophageal reflux disease, unspecified whether esophagitis present  Assessment & Plan:  - Well controlled with Pepcid  Continue same  Refill sent  - Avoid triggering food and beverage    - Will continue to monitor  Orders:  -     famotidine (PEPCID) 20 mg tablet; Take 1 tablet (20 mg total) by mouth 2 (two) times a day    5  Seasonal allergic rhinitis due to pollen  Assessment & Plan:  - Stable  - Continue Flonase daily  - Will continue to monitor  6  Healthcare maintenance  Assessment & Plan:  - Reviewed immunizations and screenings  - Declines flu shot today  - UTD with dental, vision, and GYN exams  Subjective     Patient presents today with complaints of right sided jaw pain for the past 5 days  The pain is near her right temple  Denies any right ear pain  It hurts when yawning and chewing  Jaw feels slightly stiff  Has no history of jaw pain in the past  Denies any other concerns or complaints today          Review of Systems   Constitutional: Negative for fatigue and fever  HENT: Negative for congestion, ear pain and trouble swallowing  Jaw pain     Eyes: Negative for visual disturbance  Respiratory: Negative for cough and shortness of breath  Cardiovascular: Negative for chest pain and palpitations  Gastrointestinal: Negative for abdominal pain and blood in stool  Endocrine: Negative for cold intolerance and heat intolerance  Genitourinary: Negative for difficulty urinating, dysuria and frequency  Musculoskeletal: Negative for gait problem  Skin: Negative for rash  Neurological: Negative for dizziness, syncope and headaches  Hematological: Negative for adenopathy  Psychiatric/Behavioral: Negative for behavioral problems  Past Medical History:   Diagnosis Date   • Anxiety    • GERD (gastroesophageal reflux disease)    • IUD (intrauterine device) in place      History reviewed  No pertinent surgical history  Family History   Problem Relation Age of Onset   • Hypertension Mother    • Diabetes Father    • Diabetes Paternal Grandmother    • Rectal cancer Paternal Grandmother    • Diabetes Paternal Grandfather      Social History     Socioeconomic History   • Marital status: /Civil Union     Spouse name: None   • Number of children: None   • Years of education: None   • Highest education level: None   Occupational History   • None   Tobacco Use   • Smoking status: Never   • Smokeless tobacco: Never   • Tobacco comments:     no/never passive smoke exposure   Vaping Use   • Vaping Use: Never used   Substance and Sexual Activity   • Alcohol use:  Yes   • Drug use: No   • Sexual activity: Yes     Partners: Male   Other Topics Concern   • None   Social History Narrative   • None     Social Determinants of Health     Financial Resource Strain: Not on file   Food Insecurity: Not on file   Transportation Needs: Not on file   Physical Activity: Not on file   Stress: Not on file   Social Connections: Not on file   Intimate Partner Violence: Not on file   Housing Stability: Not on file     Current Outpatient Medications on File Prior to Visit   Medication Sig   • fluticasone (FLONASE) 50 mcg/act nasal spray SPRAY 1 SPRAY INTO EACH NOSTRIL EVERY DAY   • LORazepam (ATIVAN) 0 5 mg tablet Take 1 tablet (0 5 mg total) by mouth every 8 (eight) hours as needed for anxiety   • Norethin-Eth Estradiol-Fe 0 4-35 MG-MCG CHEW Chew 1 tablet   • [DISCONTINUED] famotidine (PEPCID) 20 mg tablet Take 1 tablet (20 mg total) by mouth 2 (two) times a day   • [DISCONTINUED] venlafaxine (EFFEXOR-XR) 75 mg 24 hr capsule Take 1 tabs daily with breakfast   • cetirizine (ZyrTEC) 10 mg tablet Take 10 mg by mouth daily (Patient not taking: Reported on 11/22/2022)   • fluticasone (FLONASE) 50 mcg/act nasal spray 1 spray into each nostril daily   • ketoconazole (NIZORAL) 2 % cream APPLY TO FEET AND AROUND TOENAILS TWICE DAILY UNTIL CLEAR (Patient not taking: Reported on 6/22/2022)   • naproxen (NAPROSYN) 500 mg tablet Take 1 tablet (500 mg total) by mouth 2 (two) times a day with meals (Patient not taking: Reported on 11/22/2022)   • nystatin-triamcinolone (MYCOLOG-II) cream APPLY TOPICALLY 4 TIMES A DAY   (Patient not taking: No sig reported)   • promethazine-codeine (PHENERGAN WITH CODEINE) 6 25-10 mg/5 mL syrup Take 5 mL by mouth every 4 (four) hours as needed for cough (Patient not taking: Reported on 12/21/2022)     Allergies   Allergen Reactions   • Prednisone Shortness Of Breath   • Albumen, Egg - Food Allergy      Immunization History   Administered Date(s) Administered   • DTaP 5 1988, 1988, 1988, 10/17/1989, 04/12/1993   • Hep B, Adolescent or Pediatric 05/02/1992, 06/03/1992, 04/12/1993   • IPV 1988, 1988, 1988, 10/17/1989   • MMR 07/13/1989, 05/02/1992   • Td (adult), adsorbed 05/21/2001   • Tdap 04/23/2006, 05/09/2013       Objective     /80 (BP Location: Left arm, Patient Position: Sitting, Cuff Size: Adult)   Pulse 101   Temp 98 5 °F (36 9 °C) (Tympanic)   Ht 4' 10" (1 473 m)   Wt 57 4 kg (126 lb 9 6 oz)   SpO2 98%   BMI 26 46 kg/m²     Physical Exam  Vitals and nursing note reviewed  Constitutional:       Appearance: Normal appearance  She is not ill-appearing  HENT:      Head: Normocephalic and atraumatic  Right Ear: Tympanic membrane, ear canal and external ear normal       Left Ear: Tympanic membrane, ear canal and external ear normal       Nose: No congestion  Eyes:      Conjunctiva/sclera: Conjunctivae normal       Pupils: Pupils are equal, round, and reactive to light  Cardiovascular:      Rate and Rhythm: Normal rate and regular rhythm  Heart sounds: Normal heart sounds  Pulmonary:      Effort: Pulmonary effort is normal       Breath sounds: Normal breath sounds  Abdominal:      General: Bowel sounds are normal       Palpations: Abdomen is soft  Musculoskeletal:         General: Normal range of motion  Cervical back: Normal range of motion  Lymphadenopathy:      Cervical: No cervical adenopathy  Skin:     General: Skin is warm and dry  Neurological:      Mental Status: She is alert and oriented to person, place, and time  Cranial Nerves: No cranial nerve deficit     Psychiatric:         Mood and Affect: Mood normal          Behavior: Behavior normal        JAIDA Kramer

## 2022-12-22 RX ORDER — CYCLOBENZAPRINE HCL 5 MG
5 TABLET ORAL
Qty: 30 TABLET | Refills: 0 | Status: SHIPPED | OUTPATIENT
Start: 2022-12-22

## 2023-01-14 DIAGNOSIS — J01.00 ACUTE NON-RECURRENT MAXILLARY SINUSITIS: ICD-10-CM

## 2023-01-16 RX ORDER — FLUTICASONE PROPIONATE 50 MCG
SPRAY, SUSPENSION (ML) NASAL
Qty: 24 ML | Refills: 1 | Status: SHIPPED | OUTPATIENT
Start: 2023-01-16

## 2023-01-22 PROBLEM — R05.1 ACUTE COUGH: Status: RESOLVED | Noted: 2022-11-23 | Resolved: 2023-01-22

## 2023-01-22 PROBLEM — J01.00 ACUTE NON-RECURRENT MAXILLARY SINUSITIS: Status: RESOLVED | Noted: 2022-11-23 | Resolved: 2023-01-22

## 2023-01-24 DIAGNOSIS — F41.9 ANXIETY: ICD-10-CM

## 2023-01-25 RX ORDER — LORAZEPAM 0.5 MG/1
0.5 TABLET ORAL EVERY 8 HOURS PRN
Qty: 30 TABLET | Refills: 0 | Status: SHIPPED | OUTPATIENT
Start: 2023-01-25

## 2023-02-19 PROBLEM — Z00.00 HEALTHCARE MAINTENANCE: Status: RESOLVED | Noted: 2022-12-21 | Resolved: 2023-02-19

## 2023-03-13 DIAGNOSIS — J01.00 ACUTE NON-RECURRENT MAXILLARY SINUSITIS: ICD-10-CM

## 2023-03-13 RX ORDER — FLUTICASONE PROPIONATE 50 MCG
SPRAY, SUSPENSION (ML) NASAL
Qty: 24 ML | Refills: 2 | Status: SHIPPED | OUTPATIENT
Start: 2023-03-13

## 2023-04-03 DIAGNOSIS — J01.00 ACUTE NON-RECURRENT MAXILLARY SINUSITIS: ICD-10-CM

## 2023-04-03 RX ORDER — FLUTICASONE PROPIONATE 50 MCG
SPRAY, SUSPENSION (ML) NASAL
Qty: 24 ML | Refills: 2 | Status: SHIPPED | OUTPATIENT
Start: 2023-04-03

## 2023-06-19 DIAGNOSIS — F41.9 ANXIETY: ICD-10-CM

## 2023-06-19 DIAGNOSIS — F43.21 SITUATIONAL DEPRESSION: ICD-10-CM

## 2023-06-19 RX ORDER — VENLAFAXINE HYDROCHLORIDE 75 MG/1
CAPSULE, EXTENDED RELEASE ORAL
Qty: 90 CAPSULE | Refills: 0 | Status: SHIPPED | OUTPATIENT
Start: 2023-06-19

## 2023-07-19 DIAGNOSIS — K21.9 GASTROESOPHAGEAL REFLUX DISEASE, UNSPECIFIED WHETHER ESOPHAGITIS PRESENT: ICD-10-CM

## 2023-07-19 RX ORDER — FAMOTIDINE 20 MG/1
TABLET, FILM COATED ORAL
Qty: 180 TABLET | Refills: 0 | Status: SHIPPED | OUTPATIENT
Start: 2023-07-19

## 2023-08-02 ENCOUNTER — OFFICE VISIT (OUTPATIENT)
Dept: FAMILY MEDICINE CLINIC | Facility: CLINIC | Age: 33
End: 2023-08-02
Payer: COMMERCIAL

## 2023-08-02 VITALS
BODY MASS INDEX: 26.45 KG/M2 | WEIGHT: 126 LBS | RESPIRATION RATE: 16 BRPM | TEMPERATURE: 98.5 F | HEIGHT: 58 IN | DIASTOLIC BLOOD PRESSURE: 86 MMHG | SYSTOLIC BLOOD PRESSURE: 128 MMHG | HEART RATE: 89 BPM | OXYGEN SATURATION: 98 %

## 2023-08-02 DIAGNOSIS — K59.00 CONSTIPATION, UNSPECIFIED CONSTIPATION TYPE: ICD-10-CM

## 2023-08-02 DIAGNOSIS — F41.9 ANXIETY: ICD-10-CM

## 2023-08-02 DIAGNOSIS — E55.9 VITAMIN D INSUFFICIENCY: ICD-10-CM

## 2023-08-02 DIAGNOSIS — R19.00 ABDOMINAL WALL BULGE: ICD-10-CM

## 2023-08-02 DIAGNOSIS — Z13.1 DIABETES MELLITUS SCREENING: ICD-10-CM

## 2023-08-02 DIAGNOSIS — K21.9 GASTROESOPHAGEAL REFLUX DISEASE, UNSPECIFIED WHETHER ESOPHAGITIS PRESENT: Primary | ICD-10-CM

## 2023-08-02 DIAGNOSIS — E78.1 PRIMARY HYPERTRIGLYCERIDEMIA: ICD-10-CM

## 2023-08-02 DIAGNOSIS — R53.83 OTHER FATIGUE: ICD-10-CM

## 2023-08-02 DIAGNOSIS — F33.9 RECURRENT DEPRESSION (HCC): ICD-10-CM

## 2023-08-02 PROCEDURE — 99214 OFFICE O/P EST MOD 30 MIN: CPT | Performed by: NURSE PRACTITIONER

## 2023-08-02 NOTE — ASSESSMENT & PLAN NOTE
- Well controlled on Effexor 75 mg daily. Continue same.  - Xanax as needed - reports infrequent use. - Will continue to monitor.

## 2023-08-02 NOTE — ASSESSMENT & PLAN NOTE
- Not well controlled. - Recommend restarting Pepcid 20 mg once daily as needed. If no improvement can increase to twice daily. - Avoid triggering food and beverage.  - Remain upright at least 30-60 minutes after eating.   - Will continue to monitor.

## 2023-08-02 NOTE — ASSESSMENT & PLAN NOTE
Component      Latest Ref Rng 3/22/2021 9/13/2021   Cholesterol      50 - 200 mg/dL 184  193    Triglycerides      <=150 mg/dL 124  114    HDL      >=40 mg/dL 65  73    LDL Calculated      0 - 100 mg/dL 94  97    Non-HDL Cholesterol      mg/dl 119  120      - Well controlled. - Encourage healthy diet and regular exercise. - Will obtain updated fasting lipid panel.

## 2023-08-02 NOTE — PROGRESS NOTES
Name: Susan Pagan      : 1990      MRN: [id number]  Encounter Provider: JAIDA Calderon  Encounter Date: 2023   Encounter department: [facility]     1. Gastroesophageal reflux disease, unspecified whether esophagitis present  Assessment & Plan:  - Not well controlled. - Recommend restarting Pepcid 20 mg once daily as needed. If no improvement can increase to twice daily. - Avoid triggering food and beverage.  - Remain upright at least 30-60 minutes after eating.   - Will continue to monitor. 2. Abdominal wall bulge  Assessment & Plan:  - Will obtain ultrasound of abdomen and continue to follow up. Orders:  -     US abdomen limited; Future; Expected date: 2023    3. Primary hypertriglyceridemia  Assessment & Plan:  Component      Latest Ref Rng 3/22/2021 2021   Cholesterol      50 - 200 mg/dL 184  193    Triglycerides      <=150 mg/dL 124  114    HDL      >=40 mg/dL 65  73    LDL Calculated      0 - 100 mg/dL 94  97    Non-HDL Cholesterol      mg/dl 119  120      - Well controlled. - Encourage healthy diet and regular exercise. - Will obtain updated fasting lipid panel. Orders:  -     Comprehensive metabolic panel; Future  -     Lipid Panel with Direct LDL reflex; Future    4. Constipation, unspecified constipation type  Assessment & Plan:  - Recommend increasing dietary fiber. Can use Metamucil or Benefiber.   - Increase oral hydration.  - Colace and Miralax as needed. 5. Anxiety  Assessment & Plan:  - Well controlled on Effexor 75 mg daily. Continue same.  - Xanax as needed - reports infrequent use. - Will continue to monitor. 6. Recurrent depression ([address])  Assessment & Plan:  - Well controlled on Effexor 75 mg daily. Continue same.   - Will continue to monitor. 7. Vitamin D insufficiency  -     Vitamin D 25 hydroxy; Future    8. Diabetes mellitus screening  -     Hemoglobin A1C; Future    9. Other fatigue  -     CBC and differential; Future  -     Comprehensive metabolic panel; Future  -     TSH, 3rd generation with Free T4 reflex; Future  -     Vitamin D 25 hydroxy; Future  -     Iron Panel (Includes Ferritin, Iron Sat%, Iron, and TIBC); Future           Subjective     Patient with PMH of GERD, hypertriglyceridemia, seasonal allergies, anxiety, and depression presents today for routine follow up. She is taking her prescribed medications and reports no side effects. She has concerns today regarding weight gain and abdominal bloating. Bloating is sometimes worse after eating. Denies any abdominal pain. Does endorse occasional constipation. Also reports slight bulge in abdomen above her belly button that has been present for about 1 year. Is not tender or painful. Regarding weight gain, she does note that she can make more of an effort on exercising and watching her diet. Also reports some pain in her when she lays down after eating in the evening. Does have a history of GERD and has been taking Pepcid occasionally. Denies any other concerns or complaints today. Review of Systems   Constitutional: Positive for fatigue. Negative for fever. HENT: Negative for trouble swallowing. Eyes: Negative for visual disturbance. Respiratory: Negative for cough and shortness of breath. Cardiovascular: Negative for chest pain and palpitations. Gastrointestinal: Positive for abdominal distention and constipation. Negative for abdominal pain and blood in stool. GERD     Endocrine: Negative for cold intolerance and heat intolerance. Genitourinary: Negative for difficulty urinating and dysuria. Musculoskeletal: Negative for gait problem. Skin: Negative for rash. Neurological: Negative for dizziness, syncope and headaches. Hematological: Negative for adenopathy. Psychiatric/Behavioral: Negative for behavioral problems.        Past Medical History:   Diagnosis Date   • Anxiety    • GERD (gastroesophageal reflux disease)    • IUD (intrauterine device) in place      History reviewed. No pertinent surgical history. Family History   Problem Relation Age of Onset   • Hypertension Mother    • Diabetes Father    • Diabetes Paternal Grandmother    • Rectal cancer Paternal Grandmother    • Diabetes Paternal Grandfather      Social History     Socioeconomic History   • Marital status: /Civil Union     Spouse name: None   • Number of children: None   • Years of education: None   • Highest education level: None   Occupational History   • None   Tobacco Use   • Smoking status: Never   • Smokeless tobacco: Never   • Tobacco comments:     no/never passive smoke exposure   Vaping Use   • Vaping Use: Never used   Substance and Sexual Activity   • Alcohol use:  Yes   • Drug use: No   • Sexual activity: Yes     Partners: Male   Other Topics Concern   • None   Social History Narrative   • None     Social Determinants of Health     Financial Resource Strain: Not on file   Food Insecurity: Not on file   Transportation Needs: Not on file   Physical Activity: Not on file   Stress: Not on file   Social Connections: Not on file   Intimate Partner Violence: Not on file   Housing Stability: Not on file     Current Outpatient Medications on File Prior to Visit   Medication Sig   • cetirizine (ZyrTEC) 10 mg tablet Take 10 mg by mouth daily   • cyclobenzaprine (FLEXERIL) 5 mg tablet Take 1 tablet (5 mg total) by mouth daily at bedtime as needed for muscle spasms   • famotidine (PEPCID) 20 mg tablet TAKE 1 TABLET BY MOUTH TWICE A DAY   • fluticasone (FLONASE) 50 mcg/act nasal spray SPRAY 1 SPRAY INTO EACH NOSTRIL EVERY DAY   • LORazepam (ATIVAN) 0.5 mg tablet Take 1 tablet (0.5 mg total) by mouth every 8 (eight) hours as needed for anxiety   • Norethin-Eth Estradiol-Fe 0.4-35 MG-MCG CHEW Chew 1 tablet   • venlafaxine (EFFEXOR-XR) 75 mg 24 hr capsule TAKE 1 TABS DAILY WITH BREAKFAST   • fluticasone (FLONASE) 50 mcg/act nasal spray SPRAY 1 SPRAY INTO EACH NOSTRIL EVERY DAY (Patient not taking: Reported on 8/2/2023)   • ketoconazole (NIZORAL) 2 % cream APPLY TO FEET AND AROUND TOENAILS TWICE DAILY UNTIL CLEAR (Patient not taking: Reported on 6/22/2022)   • naproxen (NAPROSYN) 500 mg tablet Take 1 tablet (500 mg total) by mouth 2 (two) times a day with meals (Patient not taking: Reported on 11/22/2022)   • nystatin-triamcinolone (MYCOLOG-II) cream APPLY TOPICALLY 4 TIMES A DAY. (Patient not taking: No sig reported)   • promethazine-codeine (PHENERGAN WITH CODEINE) 6.25-10 mg/5 mL syrup Take 5 mL by mouth every 4 (four) hours as needed for cough (Patient not taking: Reported on 12/21/2022)     Allergies   Allergen Reactions   • Prednisone Shortness Of Breath   • Albumen, Egg - Food Allergy      Immunization History   Administered Date(s) Administered   • DTaP 5 1988, 1988, 1988, 10/17/1989, 04/12/1993   • Hep B, Adolescent or Pediatric 05/02/1992, 06/03/1992, 04/12/1993   • IPV 1988, 1988, 1988, 10/17/1989   • MMR 07/13/1989, 05/02/1992   • Td (adult), adsorbed 05/21/2001   • Tdap 04/23/2006, 05/09/2013       Objective     /86 (BP Location: Left arm, Patient Position: Sitting, Cuff Size: Adult)   Pulse 89   Temp 98.5 °F (36.9 °C) (Tympanic)   Resp 16   Ht 4' 10" (1.473 m)   Wt 57.2 kg (126 lb)   SpO2 98%   BMI 26.33 kg/m²     Physical Exam  Vitals and nursing note reviewed. Constitutional:       General: She is not in acute distress. Appearance: Normal appearance. She is not ill-appearing. HENT:      Head: Normocephalic and atraumatic. Right Ear: External ear normal.      Left Ear: External ear normal.   Eyes:      Conjunctiva/sclera: Conjunctivae normal.   Cardiovascular:      Rate and Rhythm: Normal rate and regular rhythm. Heart sounds: Normal heart sounds. Pulmonary:      Effort: Pulmonary effort is normal.      Breath sounds: Normal breath sounds.    Abdominal: General: Bowel sounds are normal.      Palpations: Abdomen is soft. Tenderness: There is no abdominal tenderness. There is no guarding. Musculoskeletal:         General: Normal range of motion. Cervical back: Normal range of motion. Lymphadenopathy:      Cervical: No cervical adenopathy. Skin:     General: Skin is warm and dry. Neurological:      Mental Status: She is alert and oriented to person, place, and time. Cranial Nerves: No cranial nerve deficit.    Psychiatric:         Mood and Affect: Mood normal.         Behavior: Behavior normal.       JAIDA Winter

## 2023-08-02 NOTE — ASSESSMENT & PLAN NOTE
- Recommend increasing dietary fiber. Can use Metamucil or Benefiber.   - Increase oral hydration.  - Colace and Miralax as needed.

## 2023-08-23 ENCOUNTER — HOSPITAL ENCOUNTER (OUTPATIENT)
Dept: RADIOLOGY | Facility: IMAGING CENTER | Age: 33
Discharge: HOME | End: 2023-08-23
Payer: COMMERCIAL

## 2023-08-23 ENCOUNTER — APPOINTMENT (OUTPATIENT)
Dept: LAB | Facility: IMAGING CENTER | Age: 33
End: 2023-08-23
Payer: COMMERCIAL

## 2023-08-23 DIAGNOSIS — Z13.1 DIABETES MELLITUS SCREENING: ICD-10-CM

## 2023-08-23 DIAGNOSIS — R53.83 OTHER FATIGUE: ICD-10-CM

## 2023-08-23 DIAGNOSIS — R19.00 ABDOMINAL WALL BULGE: ICD-10-CM

## 2023-08-23 DIAGNOSIS — E55.9 VITAMIN D INSUFFICIENCY: ICD-10-CM

## 2023-08-23 DIAGNOSIS — E78.1 PRIMARY HYPERTRIGLYCERIDEMIA: ICD-10-CM

## 2023-08-23 LAB
25(OH)D3 SERPL-MCNC: 23.5 NG/ML (ref 30–100)
ALBUMIN SERPL BCP-MCNC: 4 G/DL (ref 3.5–5)
ALP SERPL-CCNC: 62 U/L (ref 34–104)
ALT SERPL W P-5'-P-CCNC: 14 U/L (ref 7–52)
ANION GAP SERPL CALCULATED.3IONS-SCNC: 9 MMOL/L
AST SERPL W P-5'-P-CCNC: 16 U/L (ref 13–39)
BASOPHILS # BLD AUTO: 0.05 THOUSANDS/ÂΜL (ref 0–0.1)
BASOPHILS NFR BLD AUTO: 1 % (ref 0–1)
BILIRUB SERPL-MCNC: 0.32 MG/DL (ref 0.2–1)
BUN SERPL-MCNC: 10 MG/DL (ref 5–25)
CALCIUM SERPL-MCNC: 9.5 MG/DL (ref 8.4–10.2)
CHLORIDE SERPL-SCNC: 101 MMOL/L (ref 96–108)
CHOLEST SERPL-MCNC: 240 MG/DL
CO2 SERPL-SCNC: 28 MMOL/L (ref 21–32)
CREAT SERPL-MCNC: 0.76 MG/DL (ref 0.6–1.3)
EOSINOPHIL # BLD AUTO: 0.24 THOUSAND/ÂΜL (ref 0–0.61)
EOSINOPHIL NFR BLD AUTO: 3 % (ref 0–6)
ERYTHROCYTE [DISTWIDTH] IN BLOOD BY AUTOMATED COUNT: 12.2 % (ref 11.6–15.1)
EST. AVERAGE GLUCOSE BLD GHB EST-MCNC: 108 MG/DL
FERRITIN SERPL-MCNC: 14 NG/ML (ref 11–307)
GFR SERPL CREATININE-BSD FRML MDRD: 101 ML/MIN/1.73SQ M
GLUCOSE P FAST SERPL-MCNC: 89 MG/DL (ref 65–99)
HBA1C MFR BLD: 5.4 %
HCT VFR BLD AUTO: 44.9 % (ref 34.8–46.1)
HDLC SERPL-MCNC: 75 MG/DL
HGB BLD-MCNC: 15 G/DL (ref 11.5–15.4)
IMM GRANULOCYTES # BLD AUTO: 0.02 THOUSAND/UL (ref 0–0.2)
IMM GRANULOCYTES NFR BLD AUTO: 0 % (ref 0–2)
IRON SATN MFR SERPL: 20 % (ref 15–50)
IRON SERPL-MCNC: 96 UG/DL (ref 50–212)
LDLC SERPL CALC-MCNC: 113 MG/DL (ref 0–100)
LYMPHOCYTES # BLD AUTO: 2.45 THOUSANDS/ÂΜL (ref 0.6–4.47)
LYMPHOCYTES NFR BLD AUTO: 33 % (ref 14–44)
MCH RBC QN AUTO: 31.1 PG (ref 26.8–34.3)
MCHC RBC AUTO-ENTMCNC: 33.4 G/DL (ref 31.4–37.4)
MCV RBC AUTO: 93 FL (ref 82–98)
MONOCYTES # BLD AUTO: 0.65 THOUSAND/ÂΜL (ref 0.17–1.22)
MONOCYTES NFR BLD AUTO: 9 % (ref 4–12)
NEUTROPHILS # BLD AUTO: 4.03 THOUSANDS/ÂΜL (ref 1.85–7.62)
NEUTS SEG NFR BLD AUTO: 54 % (ref 43–75)
NRBC BLD AUTO-RTO: 0 /100 WBCS
PLATELET # BLD AUTO: 404 THOUSANDS/UL (ref 149–390)
PMV BLD AUTO: 10.1 FL (ref 8.9–12.7)
POTASSIUM SERPL-SCNC: 4.5 MMOL/L (ref 3.5–5.3)
PROT SERPL-MCNC: 7.3 G/DL (ref 6.4–8.4)
RBC # BLD AUTO: 4.82 MILLION/UL (ref 3.81–5.12)
SODIUM SERPL-SCNC: 138 MMOL/L (ref 135–147)
TIBC SERPL-MCNC: 475 UG/DL (ref 250–450)
TRIGL SERPL-MCNC: 258 MG/DL
TSH SERPL DL<=0.05 MIU/L-ACNC: 1.12 UIU/ML (ref 0.45–4.5)
UIBC SERPL-MCNC: 379 UG/DL (ref 155–355)
WBC # BLD AUTO: 7.44 THOUSAND/UL (ref 4.31–10.16)

## 2023-08-23 PROCEDURE — 82306 VITAMIN D 25 HYDROXY: CPT

## 2023-08-23 PROCEDURE — 83540 ASSAY OF IRON: CPT

## 2023-08-23 PROCEDURE — 83550 IRON BINDING TEST: CPT

## 2023-08-23 PROCEDURE — 83036 HEMOGLOBIN GLYCOSYLATED A1C: CPT

## 2023-08-23 PROCEDURE — 76705 ECHO EXAM OF ABDOMEN: CPT

## 2023-08-23 PROCEDURE — 82728 ASSAY OF FERRITIN: CPT

## 2023-08-23 PROCEDURE — 36415 COLL VENOUS BLD VENIPUNCTURE: CPT

## 2023-08-23 PROCEDURE — 80053 COMPREHEN METABOLIC PANEL: CPT

## 2023-08-23 PROCEDURE — 85025 COMPLETE CBC W/AUTO DIFF WBC: CPT

## 2023-08-23 PROCEDURE — 80061 LIPID PANEL: CPT

## 2023-08-23 PROCEDURE — 84443 ASSAY THYROID STIM HORMONE: CPT

## 2023-08-24 ENCOUNTER — TELEPHONE (OUTPATIENT)
Dept: FAMILY MEDICINE CLINIC | Facility: CLINIC | Age: 33
End: 2023-08-24

## 2023-08-24 NOTE — TELEPHONE ENCOUNTER
----- Message from [address] [facility] sent at 8/24/2023  7:14 AM EDT -----  Labs show low vitamin D - recommend OTC supplement 2000 units/day. Cholesterol is elevated - recommend healthy diet and regular exercise. Iron studies are fairly normal. Ferritin is still in normal range but sometimes when <30 can cause fatigue. She can try taking OTC iron supplement with Vitamin C to see if that improves her fatigue. The rest of her labs are stable.

## 2023-08-28 ENCOUNTER — TELEPHONE (OUTPATIENT)
Dept: FAMILY MEDICINE CLINIC | Facility: CLINIC | Age: 33
End: 2023-08-28

## 2023-08-28 DIAGNOSIS — R19.00 ABDOMINAL WALL BULGE: Primary | ICD-10-CM

## 2023-08-28 NOTE — TELEPHONE ENCOUNTER
----- Message from Charisma Stein, [address] [place] [address] sent at 8/25/2023  3:29 PM EDT -----  US showed diastases recti and small umbilical hernia. Recommend referral to General Surgery.

## 2023-09-13 DIAGNOSIS — F41.9 ANXIETY: ICD-10-CM

## 2023-09-13 DIAGNOSIS — F43.21 SITUATIONAL DEPRESSION: ICD-10-CM

## 2023-09-13 RX ORDER — VENLAFAXINE HYDROCHLORIDE 75 MG/1
CAPSULE, EXTENDED RELEASE ORAL
Qty: 90 CAPSULE | Refills: 0 | Status: SHIPPED | OUTPATIENT
Start: 2023-09-13

## 2023-10-01 PROBLEM — Z13.1 DIABETES MELLITUS SCREENING: Status: RESOLVED | Noted: 2023-08-02 | Resolved: 2023-10-01

## 2023-10-23 DIAGNOSIS — K21.9 GASTROESOPHAGEAL REFLUX DISEASE, UNSPECIFIED WHETHER ESOPHAGITIS PRESENT: ICD-10-CM

## 2023-10-23 RX ORDER — FAMOTIDINE 20 MG/1
TABLET, FILM COATED ORAL
Qty: 180 TABLET | Refills: 0 | Status: SHIPPED | OUTPATIENT
Start: 2023-10-23

## 2023-10-25 NOTE — PROGRESS NOTES
Assessment/Plan:   Fabricio Jackson is a 26 y. o.female who is here for   Chief Complaint   Patient presents with   • Hernia     Umbilical hernia. Patient had the hernia at least 6 months. No pain. She did have an ultrasound done        Recent ultrasound on 8/23/2023 shows a diastases recti with a tiny fat-containing umbilical hernia in a patient with a BMI of 26. Small umbilical hernia with accompanying central adiposity and diastases. Plan: Watchful observation at this point. She will decide if and when she wants to have the fat-containing umbilical hernia repaired. It is quite tiny. She understands the diastases recti and the middle bulge and her body habitus will not improve or even change after this hernia repair. That would require weight loss and core conditioning. Preoperative Clearance: None        - None, continue medication regimen including morning of surgery, with sip of water      In preparation for this visit and during this visit I have spent a total time of 30minutes, reviewing the chart, ER visits, diagnostic results, radiology results, laboratory values, and previous office visit notes from consultants, reviewing all this information and caring for this patient, providing differential diagnosis, instructions for management, counseling and coordination of care. This also includes planning surgical intervention where indicated.  ______________________________________________________  CC:Hernia (Umbilical hernia. Patient had the hernia at least 6 months. No pain. She did have an ultrasound done)  . HPI:  Fabricio Jackson is a 26 y. o.female who was referred for evaluation of Hernia (Umbilical hernia. Patient had the hernia at least 6 months. No pain. She did have an ultrasound done)  . Currently discomfort in the mid abdomen and wanted to know what her central obesity was related to if it was a diastases or hernia.   Upon review and ultrasound, this is related to central adiposity and she has a tiny umbilical hernia just at the level of the umbilicus. .       ROS:  General ROS: negative  negative for - chills, fatigue, fever or night sweats, weight loss  Respiratory ROS: no cough, shortness of breath, or wheezing  Cardiovascular ROS: no chest pain or dyspnea on exertion  Genito-Urinary ROS: no dysuria, trouble voiding, or hematuria  Musculoskeletal ROS: negative for - gait disturbance, joint pain or muscle pain  Neurological ROS: no TIA or stroke symptoms  Gastrointestinal: see HPI. No abdominal pain, melena, BRB unless specified in HPI  Skin ROS: no new rashes or lesions   Lymphatic ROS: no new adenopathy noted by pt.    GYN ROS: see HPI, no new GYN history or bleeding noted  Psy ROS: no new mental or behavioral disturbances       Patient Active Problem List   Diagnosis   • Well adult exam   • Brain cyst   • Flu vaccine need   • Anxiety   • Allergic rhinitis   • Gastroesophageal reflux disease   • Pap smear abnormality of cervix with LGSIL   • Situational depression   • Encounter for gynecological examination with abnormal finding   • Primary hypertriglyceridemia   • BV (bacterial vaginosis)   • Palpitations   • Post-viral cough syndrome   • Other fatigue   • Mid back pain   • Weight gain   • Recurrent depression (HCC)   • Acute pain of left knee   • Jaw pain   • Vitamin D insufficiency   • Abdominal wall bulge   • Constipation         Allergies:  Prednisone and Albumen, egg - food allergy      Current Outpatient Medications:   •  cetirizine (ZyrTEC) 10 mg tablet, Take 10 mg by mouth daily, Disp: , Rfl:   •  cyclobenzaprine (FLEXERIL) 5 mg tablet, Take 1 tablet (5 mg total) by mouth daily at bedtime as needed for muscle spasms, Disp: 30 tablet, Rfl: 0  •  famotidine (PEPCID) 20 mg tablet, TAKE 1 TABLET BY MOUTH TWICE A DAY, Disp: 180 tablet, Rfl: 0  •  fluticasone (FLONASE) 50 mcg/act nasal spray, SPRAY 1 SPRAY INTO EACH NOSTRIL EVERY DAY, Disp: 48 mL, Rfl: 2  •  LORazepam (ATIVAN) 0.5 mg tablet, Take 1 tablet (0.5 mg total) by mouth every 8 (eight) hours as needed for anxiety, Disp: 30 tablet, Rfl: 0  •  Norethin-Eth Estradiol-Fe 0.4-35 MG-MCG CHEW, Chew 1 tablet, Disp: , Rfl:   •  venlafaxine (EFFEXOR-XR) 75 mg 24 hr capsule, TAKE 1 TABS DAILY WITH BREAKFAST, Disp: 90 capsule, Rfl: 0  •  fluticasone (FLONASE) 50 mcg/act nasal spray, SPRAY 1 SPRAY INTO EACH NOSTRIL EVERY DAY (Patient not taking: Reported on 8/2/2023), Disp: 24 mL, Rfl: 2  •  ketoconazole (NIZORAL) 2 % cream, APPLY TO FEET AND AROUND TOENAILS TWICE DAILY UNTIL CLEAR (Patient not taking: Reported on 6/22/2022), Disp: , Rfl:   •  naproxen (NAPROSYN) 500 mg tablet, Take 1 tablet (500 mg total) by mouth 2 (two) times a day with meals (Patient not taking: Reported on 11/22/2022), Disp: 60 tablet, Rfl: 0  •  nystatin-triamcinolone (MYCOLOG-II) cream, APPLY TOPICALLY 4 TIMES A DAY. (Patient not taking: No sig reported), Disp: , Rfl:   •  promethazine-codeine (PHENERGAN WITH CODEINE) 6.25-10 mg/5 mL syrup, Take 5 mL by mouth every 4 (four) hours as needed for cough (Patient not taking: Reported on 12/21/2022), Disp: 180 mL, Rfl: 0    Past Medical History:   Diagnosis Date   • Anxiety    • GERD (gastroesophageal reflux disease)    • IUD (intrauterine device) in place        No past surgical history on file. Family History   Problem Relation Age of Onset   • Hypertension Mother    • Diabetes Father    • Diabetes Paternal Grandmother    • Rectal cancer Paternal Grandmother    • Diabetes Paternal Grandfather         reports that she has never smoked. She has never used smokeless tobacco. She reports current alcohol use. She reports that she does not use drugs.     Labs:   Lab Results   Component Value Date    WBC 7.44 08/23/2023    HGB 15.0 08/23/2023    HCT 44.9 08/23/2023    MCV 93 08/23/2023     (H) 08/23/2023     Lab Results   Component Value Date    K 4.5 08/23/2023     08/23/2023    CO2 28 08/23/2023    BUN 10 08/23/2023    CREATININE 0.76 08/23/2023    GLUF 89 08/23/2023    CALCIUM 9.5 08/23/2023    CORRECTEDCA 9.2 09/13/2021    AST 16 08/23/2023    ALT 14 08/23/2023    ALKPHOS 62 08/23/2023    EGFR 101 08/23/2023         Imaging: I personally reviewed the radiology studies, my impression is as follows: Ultrasound indicated tiny umbilical hernia and possible diastases recti        PHYSICAL EXAM    Vitals:    10/30/23 1312   BP: 148/96   Pulse: 102   Temp: 98 °F (36.7 °C)          PHYSICAL EXAM  General Appearance:    Alert, cooperative, no distress,    Head:    Normocephalic without obvious abnormality   Eyes:    PERRL, conjunctiva/corneas clear, EOM's intact        Neck:   Supple, no adenopathy, no JVD   Back:     Symmetric, no spinal or CVA tenderness   Lungs:     Clear to auscultation bilaterally, no wheezing or rhonchi   Heart:    Regular rate and rhythm, S1 and S2 normal, no murmur   Abdomen:   Central obesity with possibly a diastases recti  Tiny umbilical hernia at the level of the umbilicus, partially incarcerated but nontender. Extremities:   Extremities normal. No clubbing, cyanosis or edema   Psych:   Normal Affect   Neurologic:   CNII-XII intact. Strength symmetric, speech intact                                           Some portions of this record may have been generated with voice recognition software. There may be translation, syntax,  or grammatical errors. Occasional wrong word or "sound-a-like" substitutions may have occurred due to the inherent limitations of the voice recognition software. Read the chart carefully and recognize, using context, where substitutions may have occurred. If you have any questions, please contact the dictating provider for clarification or correction, as needed. This encounter has been coded by a non-certified coder.        Beau Prabhakar MD    Date: 10/30/2023 Time: 1:41 PM

## 2023-10-30 ENCOUNTER — OFFICE VISIT (OUTPATIENT)
Dept: SURGERY | Facility: CLINIC | Age: 33
End: 2023-10-30
Payer: COMMERCIAL

## 2023-10-30 VITALS
HEART RATE: 102 BPM | BODY MASS INDEX: 27.5 KG/M2 | DIASTOLIC BLOOD PRESSURE: 96 MMHG | SYSTOLIC BLOOD PRESSURE: 148 MMHG | HEIGHT: 58 IN | TEMPERATURE: 98 F | WEIGHT: 131 LBS

## 2023-10-30 DIAGNOSIS — R19.00 ABDOMINAL WALL BULGE: ICD-10-CM

## 2023-10-30 DIAGNOSIS — K42.9 UMBILICAL HERNIA: Primary | ICD-10-CM

## 2023-10-30 PROCEDURE — 99203 OFFICE O/P NEW LOW 30 MIN: CPT | Performed by: SURGERY

## 2023-12-11 DIAGNOSIS — F41.9 ANXIETY: ICD-10-CM

## 2023-12-11 DIAGNOSIS — F43.21 SITUATIONAL DEPRESSION: ICD-10-CM

## 2023-12-11 RX ORDER — VENLAFAXINE HYDROCHLORIDE 75 MG/1
CAPSULE, EXTENDED RELEASE ORAL
Qty: 90 CAPSULE | Refills: 0 | Status: SHIPPED | OUTPATIENT
Start: 2023-12-11

## 2024-01-29 DIAGNOSIS — K21.9 GASTROESOPHAGEAL REFLUX DISEASE, UNSPECIFIED WHETHER ESOPHAGITIS PRESENT: ICD-10-CM

## 2024-01-29 RX ORDER — FAMOTIDINE 20 MG/1
TABLET, FILM COATED ORAL
Qty: 180 TABLET | Refills: 1 | Status: SHIPPED | OUTPATIENT
Start: 2024-01-29

## 2024-02-21 PROBLEM — Z00.00 WELL ADULT EXAM: Status: RESOLVED | Noted: 2019-01-21 | Resolved: 2024-02-21

## 2024-03-08 DIAGNOSIS — F41.9 ANXIETY: ICD-10-CM

## 2024-03-08 DIAGNOSIS — F43.21 SITUATIONAL DEPRESSION: ICD-10-CM

## 2024-03-08 RX ORDER — VENLAFAXINE HYDROCHLORIDE 75 MG/1
CAPSULE, EXTENDED RELEASE ORAL
Qty: 90 CAPSULE | Refills: 0 | Status: SHIPPED | OUTPATIENT
Start: 2024-03-08

## 2024-06-05 DIAGNOSIS — F41.9 ANXIETY: ICD-10-CM

## 2024-06-05 DIAGNOSIS — F43.21 SITUATIONAL DEPRESSION: ICD-10-CM

## 2024-06-05 RX ORDER — VENLAFAXINE HYDROCHLORIDE 75 MG/1
CAPSULE, EXTENDED RELEASE ORAL
Qty: 90 CAPSULE | Refills: 0 | Status: SHIPPED | OUTPATIENT
Start: 2024-06-05

## 2024-08-05 DIAGNOSIS — K21.9 GASTROESOPHAGEAL REFLUX DISEASE, UNSPECIFIED WHETHER ESOPHAGITIS PRESENT: ICD-10-CM

## 2024-08-05 RX ORDER — FAMOTIDINE 20 MG/1
TABLET, FILM COATED ORAL
Qty: 60 TABLET | Refills: 0 | Status: SHIPPED | OUTPATIENT
Start: 2024-08-05

## 2024-08-18 DIAGNOSIS — K21.9 GASTROESOPHAGEAL REFLUX DISEASE, UNSPECIFIED WHETHER ESOPHAGITIS PRESENT: ICD-10-CM

## 2024-08-20 RX ORDER — FAMOTIDINE 20 MG/1
TABLET, FILM COATED ORAL
Qty: 180 TABLET | Refills: 1 | Status: SHIPPED | OUTPATIENT
Start: 2024-08-20

## 2024-09-04 DIAGNOSIS — F43.21 SITUATIONAL DEPRESSION: ICD-10-CM

## 2024-09-04 DIAGNOSIS — F41.9 ANXIETY: ICD-10-CM

## 2024-09-06 RX ORDER — VENLAFAXINE HYDROCHLORIDE 75 MG/1
CAPSULE, EXTENDED RELEASE ORAL
Qty: 30 CAPSULE | Refills: 0 | Status: SHIPPED | OUTPATIENT
Start: 2024-09-06

## 2024-09-06 NOTE — TELEPHONE ENCOUNTER
Pharmacy requesting refill on Venlafaxine   Last seen 8/2/23  Sent [employer] message to pt to make follow up appt  Changed quantity to 30

## 2024-10-03 DIAGNOSIS — F41.9 ANXIETY: ICD-10-CM

## 2024-10-03 DIAGNOSIS — F43.21 SITUATIONAL DEPRESSION: ICD-10-CM

## 2024-10-03 RX ORDER — VENLAFAXINE HYDROCHLORIDE 75 MG/1
CAPSULE, EXTENDED RELEASE ORAL
Qty: 30 CAPSULE | Refills: 0 | Status: SHIPPED | OUTPATIENT
Start: 2024-10-03 | End: 2024-10-09 | Stop reason: SDUPTHER

## 2024-10-09 ENCOUNTER — OFFICE VISIT (OUTPATIENT)
Dept: FAMILY MEDICINE CLINIC | Facility: CLINIC | Age: 34
End: 2024-10-09
Payer: COMMERCIAL

## 2024-10-09 VITALS
RESPIRATION RATE: 16 BRPM | HEIGHT: 58 IN | BODY MASS INDEX: 28.55 KG/M2 | TEMPERATURE: 99.5 F | DIASTOLIC BLOOD PRESSURE: 90 MMHG | HEART RATE: 94 BPM | OXYGEN SATURATION: 97 % | WEIGHT: 136 LBS | SYSTOLIC BLOOD PRESSURE: 138 MMHG

## 2024-10-09 DIAGNOSIS — F41.9 ANXIETY: Primary | ICD-10-CM

## 2024-10-09 DIAGNOSIS — J30.1 SEASONAL ALLERGIC RHINITIS DUE TO POLLEN: ICD-10-CM

## 2024-10-09 DIAGNOSIS — K21.9 GASTROESOPHAGEAL REFLUX DISEASE, UNSPECIFIED WHETHER ESOPHAGITIS PRESENT: ICD-10-CM

## 2024-10-09 DIAGNOSIS — F33.9 RECURRENT DEPRESSION (HCC): ICD-10-CM

## 2024-10-09 DIAGNOSIS — Z00.00 HEALTHCARE MAINTENANCE: ICD-10-CM

## 2024-10-09 PROCEDURE — 99214 OFFICE O/P EST MOD 30 MIN: CPT | Performed by: NURSE PRACTITIONER

## 2024-10-09 PROCEDURE — 99395 PREV VISIT EST AGE 18-39: CPT | Performed by: NURSE PRACTITIONER

## 2024-10-09 RX ORDER — LORAZEPAM 0.5 MG/1
0.5 TABLET ORAL EVERY 8 HOURS PRN
Qty: 30 TABLET | Refills: 0 | Status: SHIPPED | OUTPATIENT
Start: 2024-10-09

## 2024-10-09 RX ORDER — VENLAFAXINE HYDROCHLORIDE 75 MG/1
CAPSULE, EXTENDED RELEASE ORAL
Qty: 90 CAPSULE | Refills: 1 | Status: SHIPPED | OUTPATIENT
Start: 2024-10-09

## 2024-10-09 RX ORDER — VENLAFAXINE HYDROCHLORIDE 37.5 MG/1
37.5 CAPSULE, EXTENDED RELEASE ORAL
Qty: 90 CAPSULE | Refills: 1 | Status: SHIPPED | OUTPATIENT
Start: 2024-10-09

## 2024-10-09 NOTE — ASSESSMENT & PLAN NOTE
Depression Screening Follow-up Plan: Patient's depression screening was positive with a PHQ-9 score of 6. Patient with underlying depression and was advised to continue current medications as prescribed.    - Not well controlled.   - Continue Effexor 75 mg in the morning. Will add Effexor 37.5 mg at bedtime. Discussed side effects.   - Recommend follow up in 6 weeks.     Orders:    venlafaxine (EFFEXOR-XR) 75 mg 24 hr capsule; TAKE 1 CAPSULE BY MOUTH EVERY DAY WITH BREAKFAST    venlafaxine (EFFEXOR-XR) 37.5 mg 24 hr capsule; Take 1 capsule (37.5 mg total) by mouth daily at bedtime

## 2024-10-09 NOTE — ASSESSMENT & PLAN NOTE
Depression Screening Follow-up Plan: Patient's depression screening was positive with a PHQ-9 score of 6.     Orders:    venlafaxine (EFFEXOR-XR) 75 mg 24 hr capsule; TAKE 1 CAPSULE BY MOUTH EVERY DAY WITH BREAKFAST    venlafaxine (EFFEXOR-XR) 37.5 mg 24 hr capsule; Take 1 capsule (37.5 mg total) by mouth daily at bedtime

## 2024-10-09 NOTE — ASSESSMENT & PLAN NOTE
- Reviewed immunizations and screenings.   - UTD with dental, vision, and GYN exams.   - Routine labs ordered.     Orders:    CBC and differential; Future    Comprehensive metabolic panel; Future    Lipid Panel with Direct LDL reflex; Future    TSH, 3rd generation with Free T4 reflex; Future

## 2024-10-09 NOTE — ASSESSMENT & PLAN NOTE
- Not well controlled.   - Continue Effexor 75 mg in the morning. Will add Effexor 37.5 mg at bedtime. Discussed side effects.   - Continue Ativan as needed.   - Recommend follow up in 6 weeks.     Orders:    venlafaxine (EFFEXOR-XR) 75 mg 24 hr capsule; TAKE 1 CAPSULE BY MOUTH EVERY DAY WITH BREAKFAST    venlafaxine (EFFEXOR-XR) 37.5 mg 24 hr capsule; Take 1 capsule (37.5 mg total) by mouth daily at bedtime    LORazepam (ATIVAN) 0.5 mg tablet; Take 1 tablet (0.5 mg total) by mouth every 8 (eight) hours as needed for anxiety

## 2024-10-09 NOTE — ASSESSMENT & PLAN NOTE
- Not well controlled.  - Recommend increasing Pepcid to twice daily.   - Had suggested pantoprazole instead but patient has trouble swallowing pills and it can't be crushed.   - Recommend avoiding triggering food and beverage.  - Consider referral to GI if no improvement.

## 2024-10-09 NOTE — PROGRESS NOTES
Ambulatory Visit  Name: Nayely Garcia      : 1990      MRN: [id number]  Encounter Provider: JAIDA Da Silva  Encounter Date: 10/9/2024   Encounter department: [facility]    Assessment & Plan  Anxiety  - Not well controlled.   - Continue Effexor 75 mg in the morning. Will add Effexor 37.5 mg at bedtime. Discussed side effects.   - Continue Ativan as needed.   - Recommend follow up in 6 weeks.     Orders:    venlafaxine (EFFEXOR-XR) 75 mg 24 hr capsule; TAKE 1 CAPSULE BY MOUTH EVERY DAY WITH BREAKFAST    venlafaxine (EFFEXOR-XR) 37.5 mg 24 hr capsule; Take 1 capsule (37.5 mg total) by mouth daily at bedtime    LORazepam (ATIVAN) 0.5 mg tablet; Take 1 tablet (0.5 mg total) by mouth every 8 (eight) hours as needed for anxiety    Recurrent depression (HCC)  Depression Screening Follow-up Plan: Patient's depression screening was positive with a PHQ-9 score of 6. Patient with underlying depression and was advised to continue current medications as prescribed.    - Not well controlled.   - Continue Effexor 75 mg in the morning. Will add Effexor 37.5 mg at bedtime. Discussed side effects.   - Recommend follow up in 6 weeks.     Orders:    venlafaxine (EFFEXOR-XR) 75 mg 24 hr capsule; TAKE 1 CAPSULE BY MOUTH EVERY DAY WITH BREAKFAST    venlafaxine (EFFEXOR-XR) 37.5 mg 24 hr capsule; Take 1 capsule (37.5 mg total) by mouth daily at bedtime    Gastroesophageal reflux disease, unspecified whether esophagitis present  - Not well controlled.  - Recommend increasing Pepcid to twice daily.   - Had suggested pantoprazole instead but patient has trouble swallowing pills and it can't be crushed.   - Recommend avoiding triggering food and beverage.  - Consider referral to GI if no improvement.          Seasonal allergic rhinitis due to pollen  - Stable on Xyzal and Flonase. Continue same.   - Will continue to monitor.          Healthcare maintenance  - Reviewed immunizations and screenings.  "  - UTD with dental, vision, and GYN exams.   - Routine labs ordered.     Orders:    CBC and differential; Future    Comprehensive metabolic panel; Future    Lipid Panel with Direct LDL reflex; Future    TSH, 3rd generation with Free T4 reflex; Future         History of Present Illness     Patient with PMH of GERD, hypertriglyceridemia, seasonal allergies, anxiety, and depression presents today for routine follow up. She is taking her prescribed medications and reports no side effects. Feels as the season is changing she is starting to get the \"winter blues.\" Also complains of increasing anxiety. She is currently on Effexor 75 mg daily and Ativan as needed. Also reports increased acid reflux symptoms. Is prescribed pepcid twice daily but has only been taking it once daily. Recommended switching to pantoprazole but she has a problem swallowing pills and the medication can't be crushed. She is due for updated blood work. She denies any other concerns or complaints today.          Review of Systems   Constitutional:  Negative for fatigue and fever.   HENT:  Negative for congestion, nosebleeds and trouble swallowing.    Eyes:  Negative for visual disturbance.   Respiratory:  Negative for cough and shortness of breath.    Cardiovascular:  Negative for chest pain and palpitations.   Gastrointestinal:  Negative for abdominal pain and blood in stool.        GERD   Endocrine: Negative for cold intolerance and heat intolerance.   Genitourinary:  Negative for difficulty urinating, dysuria and frequency.   Musculoskeletal:  Negative for gait problem.   Skin:  Negative for rash.   Neurological:  Negative for dizziness, syncope and headaches.   Hematological:  Negative for adenopathy.   Psychiatric/Behavioral:  Positive for dysphoric mood. Negative for behavioral problems, self-injury and suicidal ideas. The patient is nervous/anxious.      Past Medical History:   Diagnosis Date    Anxiety     GERD (gastroesophageal reflux " disease)     IUD (intrauterine device) in place      History reviewed. No pertinent surgical history.  Family History   Problem Relation Age of Onset    Hypertension Mother     Diabetes Father     Diabetes Paternal Grandmother     Rectal cancer Paternal Grandmother     Diabetes Paternal Grandfather      Social History     Tobacco Use    Smoking status: Never    Smokeless tobacco: Never    Tobacco comments:     no/never passive smoke exposure   Vaping Use    Vaping status: Never Used   Substance and Sexual Activity    Alcohol use: Yes    Drug use: No    Sexual activity: Yes     Partners: Male     Current Outpatient Medications on File Prior to Visit   Medication Sig    cetirizine (ZyrTEC) 10 mg tablet Take 10 mg by mouth daily    cyclobenzaprine (FLEXERIL) 5 mg tablet Take 1 tablet (5 mg total) by mouth daily at bedtime as needed for muscle spasms    famotidine (PEPCID) 20 mg tablet TAKE 1 TABLET BY MOUTH TWICE A DAY    fluticasone (FLONASE) 50 mcg/act nasal spray SPRAY 1 SPRAY INTO EACH NOSTRIL EVERY DAY    Norethin-Eth Estradiol-Fe 0.4-35 MG-MCG CHEW Chew 1 tablet    [DISCONTINUED] LORazepam (ATIVAN) 0.5 mg tablet Take 1 tablet (0.5 mg total) by mouth every 8 (eight) hours as needed for anxiety    [DISCONTINUED] venlafaxine (EFFEXOR-XR) 75 mg 24 hr capsule TAKE 1 CAPSULE BY MOUTH EVERY DAY WITH BREAKFAST    fluticasone (FLONASE) 50 mcg/act nasal spray SPRAY 1 SPRAY INTO EACH NOSTRIL EVERY DAY (Patient not taking: Reported on 8/2/2023)    ketoconazole (NIZORAL) 2 % cream APPLY TO FEET AND AROUND TOENAILS TWICE DAILY UNTIL CLEAR (Patient not taking: Reported on 6/22/2022)    naproxen (NAPROSYN) 500 mg tablet Take 1 tablet (500 mg total) by mouth 2 (two) times a day with meals (Patient not taking: Reported on 11/22/2022)    nystatin-triamcinolone (MYCOLOG-II) cream APPLY TOPICALLY 4 TIMES A DAY. (Patient not taking: No sig reported)    promethazine-codeine (PHENERGAN WITH CODEINE) 6.25-10 mg/5 mL syrup Take 5 mL by  "mouth every 4 (four) hours as needed for cough (Patient not taking: Reported on 12/21/2022)     Allergies   Allergen Reactions    Prednisone Shortness Of Breath    Albumen, Egg - Food Allergy      Immunization History   Administered Date(s) Administered    DTaP 5 1990, 1990, 1990, 10/17/1989, 04/12/1993    Hep B, Adolescent or Pediatric 05/02/1992, 06/03/1992, 04/12/1993    IPV 1990, 1988, 1988, 10/17/1989    MMR 07/13/1989, 05/02/1992    Td (adult), adsorbed 05/21/2001    Tdap 04/23/2006, 05/09/2013     Objective     /90 (BP Location: Left arm, Patient Position: Sitting, Cuff Size: Standard)   Pulse 94   Temp 99.5 °F (37.5 °C) (Tympanic)   Resp 16   Ht 4' 10\" (1.473 m)   Wt 61.7 kg (136 lb)   SpO2 97%   BMI 28.42 kg/m²     Physical Exam  Vitals and nursing note reviewed.   Constitutional:       General: She is not in acute distress.     Appearance: Normal appearance. She is well-developed. She is not ill-appearing.   HENT:      Head: Normocephalic and atraumatic.      Right Ear: Tympanic membrane, ear canal and external ear normal.      Left Ear: Tympanic membrane, ear canal and external ear normal.      Nose: Nose normal.      Mouth/Throat:      Mouth: Mucous membranes are moist.      Pharynx: No posterior oropharyngeal erythema.   Eyes:      Conjunctiva/sclera: Conjunctivae normal.      Pupils: Pupils are equal, round, and reactive to light.   Cardiovascular:      Rate and Rhythm: Normal rate and regular rhythm.      Heart sounds: Normal heart sounds.   Pulmonary:      Effort: Pulmonary effort is normal.      Breath sounds: Normal breath sounds.   Abdominal:      General: Bowel sounds are normal.      Palpations: Abdomen is soft.   Musculoskeletal:         General: Normal range of motion.      Cervical back: Normal range of motion.   Lymphadenopathy:      Cervical: No cervical adenopathy.   Skin:     General: Skin is warm and dry.   Neurological:      Mental " Status: She is alert and oriented to person, place, and time.   Psychiatric:         Mood and Affect: Mood normal.         Behavior: Behavior normal.

## 2024-10-10 ENCOUNTER — OFFICE VISIT (OUTPATIENT)
Dept: URGENT CARE | Age: 34
End: 2024-10-10
Payer: COMMERCIAL

## 2024-10-10 VITALS
BODY MASS INDEX: 28.55 KG/M2 | HEIGHT: 58 IN | RESPIRATION RATE: 16 BRPM | OXYGEN SATURATION: 100 % | TEMPERATURE: 98.9 F | WEIGHT: 136 LBS | DIASTOLIC BLOOD PRESSURE: 106 MMHG | HEART RATE: 100 BPM | SYSTOLIC BLOOD PRESSURE: 168 MMHG

## 2024-10-10 DIAGNOSIS — S16.1XXA STRAIN OF MUSCLE, FASCIA AND TENDON AT NECK LEVEL, INITIAL ENCOUNTER: Primary | ICD-10-CM

## 2024-10-10 PROCEDURE — G0382 LEV 3 HOSP TYPE B ED VISIT: HCPCS

## 2024-10-10 PROCEDURE — S9083 URGENT CARE CENTER GLOBAL: HCPCS

## 2024-10-10 RX ORDER — CYCLOBENZAPRINE HCL 5 MG
5 TABLET ORAL
Qty: 5 TABLET | Refills: 0 | Status: SHIPPED | OUTPATIENT
Start: 2024-10-10 | End: 2024-10-15

## 2024-10-10 NOTE — PROGRESS NOTES
[facility]        NAME: Nayely Garcia is a 34 y.o. female  : 1990    MRN: [id number]  DATE: October 10, 2024  TIME: 3:47 PM      Assessment and Plan     Strain of muscle, fascia and tendon at neck level, initial encounter [S16.1XXA]  1. Strain of muscle, fascia and tendon at neck level, initial encounter  cyclobenzaprine (FLEXERIL) 5 mg tablet            Patient Instructions     Use muscle relaxer as directed.  Acetaminophen OTC for pain.  Recommend lidocaine patches OTC such as salon pas.   Heat or ice as needed.  PCP follow-up in 3-5 days.  Proceed to the ER if symptoms worsen.     Chief Complaint     Chief Complaint   Patient presents with    Neck Pain     Pt presents with neck and back pain. No injury. Pt states that she woke up this morning with it and believes she slept wrong. Pain is a 6/10. Requesting dr rosario for work.     Back Pain         History of Present Illness     Patient is a 34-year-old female who presents with acute left-sided neck pain radiating to the top of the left side of her back.  States she thinks that she slept wrong.  Denies numbness or tingling.  Denies chance of pregnancy.  Denies breast-feeding.  States she did take Flexeril in the past.  Denies loss of bowel or bladder.    Neck Pain   Pertinent negatives include no numbness.   Back Pain  Pertinent negatives include no numbness.       Review of Systems     Review of Systems   Musculoskeletal:  Positive for back pain and neck pain.   Neurological:  Negative for numbness.   All other systems reviewed and are negative.        Current Medications       Current Outpatient Medications:     cyclobenzaprine (FLEXERIL) 5 mg tablet, Take 1 tablet (5 mg total) by mouth daily at bedtime as needed for muscle spasms for up to 5 days, Disp: 5 tablet, Rfl: 0    cetirizine (ZyrTEC) 10 mg tablet, Take 10 mg by mouth daily, Disp: , Rfl:     cyclobenzaprine (FLEXERIL) 5 mg tablet, Take 1 tablet (5 mg total) by mouth daily at bedtime  as needed for muscle spasms, Disp: 30 tablet, Rfl: 0    famotidine (PEPCID) 20 mg tablet, TAKE 1 TABLET BY MOUTH TWICE A DAY, Disp: 180 tablet, Rfl: 1    fluticasone (FLONASE) 50 mcg/act nasal spray, SPRAY 1 SPRAY INTO EACH NOSTRIL EVERY DAY, Disp: 48 mL, Rfl: 2    fluticasone (FLONASE) 50 mcg/act nasal spray, SPRAY 1 SPRAY INTO EACH NOSTRIL EVERY DAY (Patient not taking: Reported on 8/2/2023), Disp: 24 mL, Rfl: 2    ketoconazole (NIZORAL) 2 % cream, APPLY TO FEET AND AROUND TOENAILS TWICE DAILY UNTIL CLEAR (Patient not taking: Reported on 6/22/2022), Disp: , Rfl:     LORazepam (ATIVAN) 0.5 mg tablet, Take 1 tablet (0.5 mg total) by mouth every 8 (eight) hours as needed for anxiety, Disp: 30 tablet, Rfl: 0    naproxen (NAPROSYN) 500 mg tablet, Take 1 tablet (500 mg total) by mouth 2 (two) times a day with meals (Patient not taking: Reported on 11/22/2022), Disp: 60 tablet, Rfl: 0    Norethin-Eth Estradiol-Fe 0.4-35 MG-MCG CHEW, Chew 1 tablet, Disp: , Rfl:     nystatin-triamcinolone (MYCOLOG-II) cream, APPLY TOPICALLY 4 TIMES A DAY. (Patient not taking: No sig reported), Disp: , Rfl:     promethazine-codeine (PHENERGAN WITH CODEINE) 6.25-10 mg/5 mL syrup, Take 5 mL by mouth every 4 (four) hours as needed for cough (Patient not taking: Reported on 12/21/2022), Disp: 180 mL, Rfl: 0    venlafaxine (EFFEXOR-XR) 37.5 mg 24 hr capsule, Take 1 capsule (37.5 mg total) by mouth daily at bedtime, Disp: 90 capsule, Rfl: 1    venlafaxine (EFFEXOR-XR) 75 mg 24 hr capsule, TAKE 1 CAPSULE BY MOUTH EVERY DAY WITH BREAKFAST, Disp: 90 capsule, Rfl: 1    Current Allergies     Allergies as of 10/10/2024 - Reviewed 10/10/2024   Allergen Reaction Noted    Prednisone Shortness Of Breath 06/01/2018    Albumen, egg - food allergy  02/19/2018              The following portions of the patient's history were reviewed and updated as appropriate: allergies, current medications, past family history, past medical history, past social history, past  "surgical history and problem list.     Past Medical History:   Diagnosis Date    Anxiety     GERD (gastroesophageal reflux disease)     IUD (intrauterine device) in place        History reviewed. No pertinent surgical history.    Family History   Problem Relation Age of Onset    Hypertension Mother     Diabetes Father     Diabetes Paternal Grandmother     Rectal cancer Paternal Grandmother     Diabetes Paternal Grandfather          Medications have been verified.        Objective     BP (!) 168/106   Pulse 100   Temp 98.9 °F (37.2 °C)   Resp 16   Ht 4' 10\" (1.473 m)   Wt 61.7 kg (136 lb)   LMP 09/17/2024   SpO2 100%   BMI 28.42 kg/m²   Patient's last menstrual period was 09/17/2024.         Physical Exam     Physical Exam  Vitals and nursing note reviewed.   Constitutional:       General: She is awake. She is not in acute distress.     Appearance: Normal appearance. She is not ill-appearing, toxic-appearing or diaphoretic.   Cardiovascular:      Rate and Rhythm: Normal rate.      Pulses: Normal pulses.      Heart sounds: Normal heart sounds, S1 normal and S2 normal.   Pulmonary:      Effort: Pulmonary effort is normal.      Breath sounds: Normal breath sounds and air entry.   Musculoskeletal:      Cervical back: No signs of trauma. Pain with movement and muscular tenderness (Palpable spasm to left lateral neck) present.   Skin:     General: Skin is warm.      Capillary Refill: Capillary refill takes less than 2 seconds.   Neurological:      Mental Status: She is alert.   Psychiatric:         Mood and Affect: Mood normal.         Behavior: Behavior normal.         Thought Content: Thought content normal.         Judgment: Judgment normal.       "

## 2024-10-10 NOTE — PATIENT INSTRUCTIONS
Use muscle relaxer as directed.  Acetaminophen OTC for pain.  Recommend lidocaine patches OTC such as salon pas.   Heat or ice as needed.  PCP follow-up in 3-5 days.  Proceed to the ER if symptoms worsen.

## 2024-10-10 NOTE — LETTER
October 10, 2024     Patient: Nayely Garcia   YOB: 1990   Date of Visit: 10/10/2024       To Whom It May Concern:    It is my medical opinion that Nayely Garcia may return to work on 10/14/24         Sincerely,        JAIDA Castillo    CC: No Recipients

## 2024-11-03 ENCOUNTER — OFFICE VISIT (OUTPATIENT)
Dept: URGENT CARE | Age: 34
End: 2024-11-03
Payer: COMMERCIAL

## 2024-11-03 VITALS
DIASTOLIC BLOOD PRESSURE: 85 MMHG | TEMPERATURE: 99.5 F | HEART RATE: 106 BPM | OXYGEN SATURATION: 96 % | RESPIRATION RATE: 18 BRPM | SYSTOLIC BLOOD PRESSURE: 144 MMHG

## 2024-11-03 DIAGNOSIS — J20.9 ACUTE BRONCHITIS, UNSPECIFIED ORGANISM: Primary | ICD-10-CM

## 2024-11-03 DIAGNOSIS — R05.1 ACUTE COUGH: ICD-10-CM

## 2024-11-03 DIAGNOSIS — J02.9 SORE THROAT: ICD-10-CM

## 2024-11-03 LAB — S PYO AG THROAT QL: NEGATIVE

## 2024-11-03 PROCEDURE — 87880 STREP A ASSAY W/OPTIC: CPT

## 2024-11-03 PROCEDURE — S9083 URGENT CARE CENTER GLOBAL: HCPCS | Performed by: EMERGENCY MEDICINE

## 2024-11-03 PROCEDURE — G0382 LEV 3 HOSP TYPE B ED VISIT: HCPCS | Performed by: EMERGENCY MEDICINE

## 2024-11-03 RX ORDER — AZITHROMYCIN 200 MG/5ML
POWDER, FOR SUSPENSION ORAL
Qty: 37.7 ML | Refills: 0 | Status: SHIPPED | OUTPATIENT
Start: 2024-11-03 | End: 2024-11-08

## 2024-11-03 RX ORDER — AZITHROMYCIN 250 MG/1
TABLET, FILM COATED ORAL
Qty: 6 TABLET | Refills: 0 | Status: SHIPPED | OUTPATIENT
Start: 2024-11-03 | End: 2024-11-03 | Stop reason: CLARIF

## 2024-11-03 NOTE — LETTER
November 3, 2024     Patient: Nayely Garcia   YOB: 1990   Date of Visit: 11/3/2024       To Whom it May Concern:    Nayely Garcia was seen in my clinic on 11/3/2024. She may return to work on 11/5/2024 .    If you have any questions or concerns, please don't hesitate to call.         Sincerely,          Oneida Monk PA-C        CC: No Recipients

## 2024-11-03 NOTE — LETTER
November 3, 2024     Patient: Nayely Garcia   YOB: 1990   Date of Visit: 11/3/2024       To Whom it May Concern:    Nayely Garcia was seen in my clinic on 11/3/2024. She may return to school on 11/5/2024 .    If you have any questions or concerns, please don't hesitate to call.         Sincerely,          Oneida Monk PA-C        CC: No Recipients

## 2024-11-03 NOTE — PROGRESS NOTES
[facility]        NAME: Nayely Garcia is a 34 y.o. female  : 1990    MRN: [id number]  DATE: November 3, 2024  TIME: 5:08 PM    Assessment and Plan   Acute bronchitis, unspecified organism [J20.9]  1. Acute bronchitis, unspecified organism  azithromycin (ZITHROMAX) 200 mg/5 mL suspension    DISCONTINUED: azithromycin (ZITHROMAX) 250 mg tablet      2. Sore throat  POCT rapid ANTIGEN strepA      3. Acute cough          Patient refused POCT rapid covid testing at this time.    POCT rapid strep: Negative    Patient Instructions     Start antibiotics as prescribed  Vitamin D3 2000 IU daily  Vitamin C 1000mg twice per day  Multivitamin daily  Fluids and rest  Over the counter cold medication as needed (EX: Mucinex, tylenol/motrin)  Follow up with PCP in 3-5 days.  Proceed to ER if symptoms worsen.    Eat yogurt with live and active cultures and/or take a probiotic at least 3 hours before or after antibiotic dose. Monitor stool for diarrhea and/or blood. If this occurs, contact primary care doctor ASAP.    If tests are performed, our office will contact you with results only if changes need to made to the care plan discussed with you at the visit. [facility]    Chief Complaint     Chief Complaint   Patient presents with    Sore Throat     Patient reports sore throat, dry cough, chest congestion X 1 week.         History of Present Illness       Patient is a 35 yo female with significant PMH of seasonal allergies presenting in the clinic today for cold sx x 1 week. Admits body aches, sore throat, dry cough, chest congestion, and right ear pain. Denies fever, chills, headache, dizziness, sinus pain/pressure, nasal congestion, rhinorrhea, right ear discharge, decreased hearing, chest pain, SOB, abdominal pain, n/v/d. Admits the use of Mucinex for symptom management.  Positive recent sick contacts at home. Denies cardiac conditions, respiratory conditions, and  smoking.      Review of Systems   Review of Systems   Constitutional:  Negative for chills, fatigue and fever.   HENT:  Positive for congestion, ear pain and sore throat. Negative for ear discharge, hearing loss, postnasal drip, rhinorrhea, sinus pressure and sinus pain.    Respiratory:  Positive for cough. Negative for shortness of breath.    Cardiovascular:  Negative for chest pain.   Gastrointestinal:  Negative for abdominal pain, diarrhea, nausea and vomiting.   Musculoskeletal:  Positive for myalgias.   Skin:  Negative for rash.   Neurological:  Negative for dizziness and headaches.         Current Medications       Current Outpatient Medications:     azithromycin (ZITHROMAX) 200 mg/5 mL suspension, Take 12.5 mL (500 mg total) by mouth daily for 1 day, THEN 6.3 mL (250 mg total) daily for 4 days., Disp: 37.7 mL, Rfl: 0    cyclobenzaprine (FLEXERIL) 5 mg tablet, Take 1 tablet (5 mg total) by mouth daily at bedtime as needed for muscle spasms, Disp: 30 tablet, Rfl: 0    famotidine (PEPCID) 20 mg tablet, TAKE 1 TABLET BY MOUTH TWICE A DAY, Disp: 180 tablet, Rfl: 1    fluticasone (FLONASE) 50 mcg/act nasal spray, SPRAY 1 SPRAY INTO EACH NOSTRIL EVERY DAY, Disp: 48 mL, Rfl: 2    LORazepam (ATIVAN) 0.5 mg tablet, Take 1 tablet (0.5 mg total) by mouth every 8 (eight) hours as needed for anxiety, Disp: 30 tablet, Rfl: 0    Norethin-Eth Estradiol-Fe 0.4-35 MG-MCG CHEW, Chew 1 tablet, Disp: , Rfl:     venlafaxine (EFFEXOR-XR) 37.5 mg 24 hr capsule, Take 1 capsule (37.5 mg total) by mouth daily at bedtime, Disp: 90 capsule, Rfl: 1    venlafaxine (EFFEXOR-XR) 75 mg 24 hr capsule, TAKE 1 CAPSULE BY MOUTH EVERY DAY WITH BREAKFAST, Disp: 90 capsule, Rfl: 1    cetirizine (ZyrTEC) 10 mg tablet, Take 10 mg by mouth daily (Patient not taking: Reported on 11/3/2024), Disp: , Rfl:     cyclobenzaprine (FLEXERIL) 5 mg tablet, Take 1 tablet (5 mg total) by mouth daily at bedtime as needed for muscle spasms for up to 5 days, Disp: 5  tablet, Rfl: 0    fluticasone (FLONASE) 50 mcg/act nasal spray, SPRAY 1 SPRAY INTO EACH NOSTRIL EVERY DAY (Patient not taking: Reported on 8/2/2023), Disp: 24 mL, Rfl: 2    ketoconazole (NIZORAL) 2 % cream, APPLY TO FEET AND AROUND TOENAILS TWICE DAILY UNTIL CLEAR (Patient not taking: Reported on 6/22/2022), Disp: , Rfl:     naproxen (NAPROSYN) 500 mg tablet, Take 1 tablet (500 mg total) by mouth 2 (two) times a day with meals (Patient not taking: Reported on 11/22/2022), Disp: 60 tablet, Rfl: 0    nystatin-triamcinolone (MYCOLOG-II) cream, APPLY TOPICALLY 4 TIMES A DAY. (Patient not taking: No sig reported), Disp: , Rfl:     promethazine-codeine (PHENERGAN WITH CODEINE) 6.25-10 mg/5 mL syrup, Take 5 mL by mouth every 4 (four) hours as needed for cough (Patient not taking: Reported on 12/21/2022), Disp: 180 mL, Rfl: 0    Current Allergies     Allergies as of 11/03/2024 - Reviewed 11/03/2024   Allergen Reaction Noted    Prednisone Shortness Of Breath 06/01/2018    Albumen, egg - food allergy  02/19/2018            The following portions of the patient's history were reviewed and updated as appropriate: allergies, current medications, past family history, past medical history, past social history, past surgical history and problem list.     Past Medical History:   Diagnosis Date    Anxiety     GERD (gastroesophageal reflux disease)     IUD (intrauterine device) in place        No past surgical history on file.    Family History   Problem Relation Age of Onset    Hypertension Mother     Diabetes Father     Diabetes Paternal Grandmother     Rectal cancer Paternal Grandmother     Diabetes Paternal Grandfather          Medications have been verified.        Objective   /85   Pulse (!) 106   Temp 99.5 °F (37.5 °C)   Resp 18   LMP 09/17/2024   SpO2 96%        Physical Exam     Physical Exam  Vitals reviewed.   Constitutional:       General: She is not in acute distress.     Appearance: Normal appearance. She is  normal weight. She is not ill-appearing.   HENT:      Head: Normocephalic.      Right Ear: Hearing, tympanic membrane, ear canal and external ear normal. No middle ear effusion. There is no impacted cerumen. Tympanic membrane is not erythematous or bulging.      Left Ear: Hearing, tympanic membrane, ear canal and external ear normal.  No middle ear effusion. There is no impacted cerumen. Tympanic membrane is not erythematous or bulging.      Nose: Nose normal. No congestion or rhinorrhea.      Right Sinus: No maxillary sinus tenderness or frontal sinus tenderness.      Left Sinus: No maxillary sinus tenderness or frontal sinus tenderness.      Mouth/Throat:      Lips: Pink.      Mouth: Mucous membranes are moist.      Pharynx: Oropharynx is clear. Uvula midline. Postnasal drip present. No pharyngeal swelling, oropharyngeal exudate, posterior oropharyngeal erythema or uvula swelling.      Tonsils: No tonsillar exudate or tonsillar abscesses. 1+ on the right. 1+ on the left.   Eyes:      General:         Right eye: No discharge.         Left eye: No discharge.      Conjunctiva/sclera: Conjunctivae normal.   Cardiovascular:      Rate and Rhythm: Normal rate and regular rhythm.      Pulses: Normal pulses.      Heart sounds: Normal heart sounds. No murmur heard.     No friction rub. No gallop.   Pulmonary:      Effort: Pulmonary effort is normal. No respiratory distress.      Breath sounds: Normal breath sounds. No stridor. No wheezing, rhonchi or rales.      Comments: Coarse breath sounds noted  Musculoskeletal:      Cervical back: Normal range of motion and neck supple. No tenderness.   Lymphadenopathy:      Cervical: No cervical adenopathy.   Skin:     General: Skin is warm.      Findings: No rash.   Neurological:      Mental Status: She is alert.   Psychiatric:         Mood and Affect: Mood normal.         Behavior: Behavior normal.

## 2024-11-08 PROBLEM — Z00.00 HEALTHCARE MAINTENANCE: Status: RESOLVED | Noted: 2024-10-09 | Resolved: 2024-11-08

## 2024-12-07 ENCOUNTER — APPOINTMENT (OUTPATIENT)
Dept: LAB | Age: 34
End: 2024-12-07
Payer: COMMERCIAL

## 2024-12-07 DIAGNOSIS — Z00.00 HEALTHCARE MAINTENANCE: ICD-10-CM

## 2024-12-07 LAB
ALBUMIN SERPL BCG-MCNC: 4 G/DL (ref 3.5–5)
ALP SERPL-CCNC: 62 U/L (ref 34–104)
ALT SERPL W P-5'-P-CCNC: 11 U/L (ref 7–52)
ANION GAP SERPL CALCULATED.3IONS-SCNC: 9 MMOL/L (ref 4–13)
AST SERPL W P-5'-P-CCNC: 14 U/L (ref 13–39)
BASOPHILS # BLD AUTO: 0.04 THOUSANDS/ÂΜL (ref 0–0.1)
BASOPHILS NFR BLD AUTO: 1 % (ref 0–1)
BILIRUB SERPL-MCNC: 0.34 MG/DL (ref 0.2–1)
BUN SERPL-MCNC: 10 MG/DL (ref 5–25)
CALCIUM SERPL-MCNC: 9.2 MG/DL (ref 8.4–10.2)
CHLORIDE SERPL-SCNC: 100 MMOL/L (ref 96–108)
CHOLEST SERPL-MCNC: 232 MG/DL (ref ?–200)
CO2 SERPL-SCNC: 27 MMOL/L (ref 21–32)
CREAT SERPL-MCNC: 0.68 MG/DL (ref 0.6–1.3)
EOSINOPHIL # BLD AUTO: 0.25 THOUSAND/ÂΜL (ref 0–0.61)
EOSINOPHIL NFR BLD AUTO: 3 % (ref 0–6)
ERYTHROCYTE [DISTWIDTH] IN BLOOD BY AUTOMATED COUNT: 12.4 % (ref 11.6–15.1)
GFR SERPL CREATININE-BSD FRML MDRD: 112 ML/MIN/1.73SQ M
GLUCOSE P FAST SERPL-MCNC: 98 MG/DL (ref 65–99)
HCT VFR BLD AUTO: 44.4 % (ref 34.8–46.1)
HDLC SERPL-MCNC: 60 MG/DL
HGB BLD-MCNC: 14.5 G/DL (ref 11.5–15.4)
IMM GRANULOCYTES # BLD AUTO: 0.06 THOUSAND/UL (ref 0–0.2)
IMM GRANULOCYTES NFR BLD AUTO: 1 % (ref 0–2)
LDLC SERPL CALC-MCNC: 113 MG/DL (ref 0–100)
LYMPHOCYTES # BLD AUTO: 3.01 THOUSANDS/ÂΜL (ref 0.6–4.47)
LYMPHOCYTES NFR BLD AUTO: 35 % (ref 14–44)
MCH RBC QN AUTO: 29.9 PG (ref 26.8–34.3)
MCHC RBC AUTO-ENTMCNC: 32.7 G/DL (ref 31.4–37.4)
MCV RBC AUTO: 92 FL (ref 82–98)
MONOCYTES # BLD AUTO: 0.48 THOUSAND/ÂΜL (ref 0.17–1.22)
MONOCYTES NFR BLD AUTO: 6 % (ref 4–12)
NEUTROPHILS # BLD AUTO: 4.66 THOUSANDS/ÂΜL (ref 1.85–7.62)
NEUTS SEG NFR BLD AUTO: 54 % (ref 43–75)
NRBC BLD AUTO-RTO: 0 /100 WBCS
PLATELET # BLD AUTO: 427 THOUSANDS/UL (ref 149–390)
PMV BLD AUTO: 9.7 FL (ref 8.9–12.7)
POTASSIUM SERPL-SCNC: 4.4 MMOL/L (ref 3.5–5.3)
PROT SERPL-MCNC: 7.1 G/DL (ref 6.4–8.4)
RBC # BLD AUTO: 4.85 MILLION/UL (ref 3.81–5.12)
SODIUM SERPL-SCNC: 136 MMOL/L (ref 135–147)
TRIGL SERPL-MCNC: 294 MG/DL (ref ?–150)
TSH SERPL DL<=0.05 MIU/L-ACNC: 1.12 UIU/ML (ref 0.45–4.5)
WBC # BLD AUTO: 8.5 THOUSAND/UL (ref 4.31–10.16)

## 2024-12-07 PROCEDURE — 80061 LIPID PANEL: CPT

## 2024-12-07 PROCEDURE — 85025 COMPLETE CBC W/AUTO DIFF WBC: CPT

## 2024-12-07 PROCEDURE — 84443 ASSAY THYROID STIM HORMONE: CPT

## 2024-12-07 PROCEDURE — 36415 COLL VENOUS BLD VENIPUNCTURE: CPT

## 2024-12-07 PROCEDURE — 80053 COMPREHEN METABOLIC PANEL: CPT

## 2024-12-10 ENCOUNTER — OFFICE VISIT (OUTPATIENT)
Dept: FAMILY MEDICINE CLINIC | Facility: CLINIC | Age: 34
End: 2024-12-10
Payer: COMMERCIAL

## 2024-12-10 VITALS
TEMPERATURE: 98.3 F | WEIGHT: 136 LBS | HEIGHT: 58 IN | OXYGEN SATURATION: 98 % | HEART RATE: 106 BPM | DIASTOLIC BLOOD PRESSURE: 80 MMHG | BODY MASS INDEX: 28.55 KG/M2 | SYSTOLIC BLOOD PRESSURE: 128 MMHG | RESPIRATION RATE: 16 BRPM

## 2024-12-10 DIAGNOSIS — E78.1 PRIMARY HYPERTRIGLYCERIDEMIA: ICD-10-CM

## 2024-12-10 DIAGNOSIS — F41.9 ANXIETY: ICD-10-CM

## 2024-12-10 DIAGNOSIS — F32.2 SEVERE MAJOR DEPRESSIVE DISORDER (HCC): Primary | ICD-10-CM

## 2024-12-10 DIAGNOSIS — K21.9 GASTROESOPHAGEAL REFLUX DISEASE, UNSPECIFIED WHETHER ESOPHAGITIS PRESENT: ICD-10-CM

## 2024-12-10 PROCEDURE — 99214 OFFICE O/P EST MOD 30 MIN: CPT | Performed by: NURSE PRACTITIONER

## 2024-12-10 RX ORDER — VENLAFAXINE HYDROCHLORIDE 75 MG/1
CAPSULE, EXTENDED RELEASE ORAL
Qty: 90 CAPSULE | Refills: 1 | Status: SHIPPED | OUTPATIENT
Start: 2024-12-10

## 2024-12-10 RX ORDER — METRONIDAZOLE 7.5 MG/G
GEL VAGINAL
COMMUNITY
Start: 2024-11-01

## 2024-12-10 RX ORDER — FLUCONAZOLE 150 MG/1
150 TABLET ORAL
COMMUNITY
Start: 2024-11-01

## 2024-12-10 RX ORDER — VENLAFAXINE HYDROCHLORIDE 37.5 MG/1
37.5 CAPSULE, EXTENDED RELEASE ORAL
Qty: 90 CAPSULE | Refills: 1 | Status: SHIPPED | OUTPATIENT
Start: 2024-12-10

## 2024-12-10 NOTE — ASSESSMENT & PLAN NOTE
- Improving.   - Continue Effexor 75 mg in the morning and 37.5 mg at bedtime.   - Will continue to monitor.   Orders:  •  venlafaxine (EFFEXOR-XR) 37.5 mg 24 hr capsule; Take 1 capsule (37.5 mg total) by mouth daily at bedtime  •  venlafaxine (EFFEXOR-XR) 75 mg 24 hr capsule; TAKE 1 CAPSULE BY MOUTH EVERY DAY WITH BREAKFAST

## 2024-12-10 NOTE — ASSESSMENT & PLAN NOTE
Orders:  •  venlafaxine (EFFEXOR-XR) 37.5 mg 24 hr capsule; Take 1 capsule (37.5 mg total) by mouth daily at bedtime  •  venlafaxine (EFFEXOR-XR) 75 mg 24 hr capsule; TAKE 1 CAPSULE BY MOUTH EVERY DAY WITH BREAKFAST

## 2024-12-10 NOTE — ASSESSMENT & PLAN NOTE
- Improving.   - Continue Effexor 75 mg in the morning and 37.5 mg at bedtime.   - Continue Ativan as needed.  - Will continue to monitor.   Orders:  •  venlafaxine (EFFEXOR-XR) 37.5 mg 24 hr capsule; Take 1 capsule (37.5 mg total) by mouth daily at bedtime  •  venlafaxine (EFFEXOR-XR) 75 mg 24 hr capsule; TAKE 1 CAPSULE BY MOUTH EVERY DAY WITH BREAKFAST

## 2024-12-10 NOTE — ASSESSMENT & PLAN NOTE
- Improving.   - Continue Pepcid.  - Avoid triggering food and beverage.  - Will continue to monitor.

## 2024-12-10 NOTE — PROGRESS NOTES
Name: Nayely Garcia      : 1990      MRN: [id number]  Encounter Provider: JAIDA Da Silva  Encounter Date: 12/10/2024   Encounter department: [facility]    Assessment & Plan  Anxiety  - Improving.   - Continue Effexor 75 mg in the morning and 37.5 mg at bedtime.   - Continue Ativan as needed.  - Will continue to monitor.   Orders:  •  venlafaxine (EFFEXOR-XR) 37.5 mg 24 hr capsule; Take 1 capsule (37.5 mg total) by mouth daily at bedtime  •  venlafaxine (EFFEXOR-XR) 75 mg 24 hr capsule; TAKE 1 CAPSULE BY MOUTH EVERY DAY WITH BREAKFAST    Severe major depressive disorder (HCC)  - Improving.   - Continue Effexor 75 mg in the morning and 37.5 mg at bedtime.   - Will continue to monitor.   Orders:  •  venlafaxine (EFFEXOR-XR) 37.5 mg 24 hr capsule; Take 1 capsule (37.5 mg total) by mouth daily at bedtime  •  venlafaxine (EFFEXOR-XR) 75 mg 24 hr capsule; TAKE 1 CAPSULE BY MOUTH EVERY DAY WITH BREAKFAST    Gastroesophageal reflux disease, unspecified whether esophagitis present  - Improving.   - Continue Pepcid.  - Avoid triggering food and beverage.  - Will continue to monitor.        Primary hypertriglyceridemia  Component      Latest Ref Rng 2023   Cholesterol      See Comment mg/dL 240 (H)  232 (H)    Triglycerides      See Comment mg/dL 258 (H)  294 (H)    HDL      >=50 mg/dL 75  60    LDL Calculated      0 - 100 mg/dL 113 (H)  113 (H)      - Not well controlled.  - Discussed low fat diet and regular exercise.  - Will repeat fasting lipid panel in 6 months.   Orders:  •  Lipid Panel with Direct LDL reflex; Future         History of Present Illness     Patient presents to office today for 1 month follow up regarding anxiety and depression. Her Effexor was increased. She reports her symptoms are improving and she feels better. Also reports decrease in her acid reflux symptoms. She is taking Pepcid daily. She had her blood work done which showed elevated  triglycerides but the rest of her labs were stable. She denies any significant concerns or complaints today.          Review of Systems   Constitutional:  Negative for fatigue and fever.   HENT:  Negative for trouble swallowing.    Eyes:  Negative for visual disturbance.   Respiratory:  Negative for cough and shortness of breath.    Cardiovascular:  Negative for chest pain and palpitations.   Gastrointestinal:  Negative for abdominal pain and blood in stool.   Endocrine: Negative for cold intolerance and heat intolerance.   Genitourinary:  Negative for difficulty urinating and dysuria.   Musculoskeletal:  Negative for gait problem.   Skin:  Negative for rash.   Neurological:  Negative for dizziness, syncope and headaches.   Hematological:  Negative for adenopathy.   Psychiatric/Behavioral:  Negative for behavioral problems.      Past Medical History:   Diagnosis Date   • Anxiety    • GERD (gastroesophageal reflux disease)    • IUD (intrauterine device) in place      History reviewed. No pertinent surgical history.  Family History   Problem Relation Age of Onset   • Hypertension Mother    • Diabetes Father    • Diabetes Paternal Grandmother    • Rectal cancer Paternal Grandmother    • Diabetes Paternal Grandfather      Social History     Tobacco Use   • Smoking status: Never   • Smokeless tobacco: Never   • Tobacco comments:     no/never passive smoke exposure   Vaping Use   • Vaping status: Never Used   Substance and Sexual Activity   • Alcohol use: Yes   • Drug use: No   • Sexual activity: Yes     Partners: Male     Current Outpatient Medications on File Prior to Visit   Medication Sig   • cyclobenzaprine (FLEXERIL) 5 mg tablet Take 1 tablet (5 mg total) by mouth daily at bedtime as needed for muscle spasms   • famotidine (PEPCID) 20 mg tablet TAKE 1 TABLET BY MOUTH TWICE A DAY   • fluticasone (FLONASE) 50 mcg/act nasal spray SPRAY 1 SPRAY INTO EACH NOSTRIL EVERY DAY   • LORazepam (ATIVAN) 0.5 mg tablet Take 1  tablet (0.5 mg total) by mouth every 8 (eight) hours as needed for anxiety   • Norethin-Eth Estradiol-Fe 0.4-35 MG-MCG CHEW Chew 1 tablet   • [DISCONTINUED] venlafaxine (EFFEXOR-XR) 37.5 mg 24 hr capsule Take 1 capsule (37.5 mg total) by mouth daily at bedtime   • [DISCONTINUED] venlafaxine (EFFEXOR-XR) 75 mg 24 hr capsule TAKE 1 CAPSULE BY MOUTH EVERY DAY WITH BREAKFAST   • cetirizine (ZyrTEC) 10 mg tablet Take 10 mg by mouth daily (Patient not taking: Reported on 11/3/2024)   • cyclobenzaprine (FLEXERIL) 5 mg tablet Take 1 tablet (5 mg total) by mouth daily at bedtime as needed for muscle spasms for up to 5 days   • fluconazole (DIFLUCAN) 150 mg tablet Take 150 mg by mouth (Patient not taking: Reported on 12/10/2024)   • fluticasone (FLONASE) 50 mcg/act nasal spray SPRAY 1 SPRAY INTO EACH NOSTRIL EVERY DAY (Patient not taking: No sig reported)   • ketoconazole (NIZORAL) 2 % cream APPLY TO FEET AND AROUND TOENAILS TWICE DAILY UNTIL CLEAR (Patient not taking: Reported on 6/22/2022)   • metroNIDAZOLE (METROGEL) 0.75 % vaginal gel Insert into the vagina (Patient not taking: Reported on 12/10/2024)   • naproxen (NAPROSYN) 500 mg tablet Take 1 tablet (500 mg total) by mouth 2 (two) times a day with meals (Patient not taking: Reported on 11/22/2022)   • nystatin-triamcinolone (MYCOLOG-II) cream APPLY TOPICALLY 4 TIMES A DAY. (Patient not taking: No sig reported)   • promethazine-codeine (PHENERGAN WITH CODEINE) 6.25-10 mg/5 mL syrup Take 5 mL by mouth every 4 (four) hours as needed for cough (Patient not taking: Reported on 12/21/2022)     Allergies   Allergen Reactions   • Prednisone Shortness Of Breath   • Albumen, Egg - Food Allergy      Immunization History   Administered Date(s) Administered   • DTaP 5 1990, 1990, 1990, 10/17/1989, 04/12/1993   • Hep B, Adolescent or Pediatric 05/02/1992, 06/03/1992, 04/12/1993   • IPV 1990, 1990, 1988, 10/17/1989   • MMR 07/13/1989, 05/02/1992  "  • Td (adult), adsorbed 05/21/2001   • Tdap 04/23/2006, 05/09/2013     Objective   /80 (BP Location: Left arm, Patient Position: Sitting, Cuff Size: Adult)   Pulse (!) 106   Temp 98.3 °F (36.8 °C) (Tympanic Core)   Resp 16   Ht 4' 10\" (1.473 m)   Wt 61.7 kg (136 lb)   SpO2 98%   BMI 28.42 kg/m²     Physical Exam  Vitals and nursing note reviewed.   Constitutional:       Appearance: Normal appearance. She is well-developed.   HENT:      Head: Normocephalic and atraumatic.      Right Ear: External ear normal.      Left Ear: External ear normal.   Eyes:      Conjunctiva/sclera: Conjunctivae normal.   Cardiovascular:      Rate and Rhythm: Normal rate and regular rhythm.      Heart sounds: Normal heart sounds.   Pulmonary:      Effort: Pulmonary effort is normal.      Breath sounds: Normal breath sounds.   Musculoskeletal:         General: Normal range of motion.      Cervical back: Normal range of motion.   Skin:     General: Skin is warm and dry.   Neurological:      Mental Status: She is alert and oriented to person, place, and time.   Psychiatric:         Mood and Affect: Mood normal.         Behavior: Behavior normal.         "

## 2024-12-10 NOTE — ASSESSMENT & PLAN NOTE
Component      Latest Ref Rng 8/23/2023 12/7/2024   Cholesterol      See Comment mg/dL 240 (H)  232 (H)    Triglycerides      See Comment mg/dL 258 (H)  294 (H)    HDL      >=50 mg/dL 75  60    LDL Calculated      0 - 100 mg/dL 113 (H)  113 (H)      - Not well controlled.  - Discussed low fat diet and regular exercise.  - Will repeat fasting lipid panel in 6 months.   Orders:  •  Lipid Panel with Direct LDL reflex; Future

## 2025-04-05 DIAGNOSIS — K21.9 GASTROESOPHAGEAL REFLUX DISEASE, UNSPECIFIED WHETHER ESOPHAGITIS PRESENT: ICD-10-CM

## 2025-04-06 RX ORDER — FAMOTIDINE 20 MG/1
20 TABLET, FILM COATED ORAL 2 TIMES DAILY
Qty: 180 TABLET | Refills: 1 | Status: SHIPPED | OUTPATIENT
Start: 2025-04-06

## 2025-04-07 ENCOUNTER — PATIENT MESSAGE (OUTPATIENT)
Dept: FAMILY MEDICINE CLINIC | Facility: CLINIC | Age: 35
End: 2025-04-07

## 2025-04-08 NOTE — PATIENT COMMUNICATION
Pt will be faxing form for FMLA due to increased depression and anxiety. She is aware that a visit is needed to complete form.  Message sent to pt through her my chart to schedule a visit

## 2025-04-16 ENCOUNTER — OFFICE VISIT (OUTPATIENT)
Dept: FAMILY MEDICINE CLINIC | Facility: CLINIC | Age: 35
End: 2025-04-16
Payer: COMMERCIAL

## 2025-04-16 VITALS
SYSTOLIC BLOOD PRESSURE: 138 MMHG | HEIGHT: 58 IN | DIASTOLIC BLOOD PRESSURE: 90 MMHG | OXYGEN SATURATION: 96 % | HEART RATE: 116 BPM | RESPIRATION RATE: 16 BRPM | BODY MASS INDEX: 29.6 KG/M2 | WEIGHT: 141 LBS | TEMPERATURE: 98.8 F

## 2025-04-16 DIAGNOSIS — F41.9 ANXIETY: Primary | ICD-10-CM

## 2025-04-16 DIAGNOSIS — F32.2 SEVERE MAJOR DEPRESSIVE DISORDER (HCC): ICD-10-CM

## 2025-04-16 PROCEDURE — 99214 OFFICE O/P EST MOD 30 MIN: CPT | Performed by: NURSE PRACTITIONER

## 2025-04-16 RX ORDER — LORAZEPAM 0.5 MG/1
0.5 TABLET ORAL 2 TIMES DAILY PRN
Qty: 60 TABLET | Refills: 0 | Status: SHIPPED | OUTPATIENT
Start: 2025-04-16

## 2025-04-16 RX ORDER — BUSPIRONE HYDROCHLORIDE 5 MG/1
5 TABLET ORAL 2 TIMES DAILY
Qty: 60 TABLET | Refills: 5 | Status: SHIPPED | OUTPATIENT
Start: 2025-04-16

## 2025-04-16 NOTE — ASSESSMENT & PLAN NOTE
- Not well controlled.  - Continue Effexor 75 mg daily.   - Will add Buspar 5 mg twice daily. Discussed side effects.  - Continue Ativan 0.5 mg twice daily PRN.   - Continue routine visits with therapist.   - Will complete [facility] paperwork.   - Recommend follow up in 1 month.   Orders:  •  busPIRone (BUSPAR) 5 mg tablet; Take 1 tablet (5 mg total) by mouth 2 (two) times a day  •  LORazepam (ATIVAN) 0.5 mg tablet; Take 1 tablet (0.5 mg total) by mouth 2 (two) times a day as needed for anxiety

## 2025-04-16 NOTE — PROGRESS NOTES
Name: Nayely Garcia      : 1990      MRN: [id number]  Encounter Provider: JAIDA Da Silva  Encounter Date: 2025   Encounter department: [facility]  :  Assessment & Plan  Anxiety  - Not well controlled.  - Continue Effexor 75 mg daily.   - Will add Buspar 5 mg twice daily. Discussed side effects.  - Continue Ativan 0.5 mg twice daily PRN.   - Continue routine visits with therapist.   - Will complete FMLA paperwork.   - Recommend follow up in 1 month.   Orders:  •  busPIRone (BUSPAR) 5 mg tablet; Take 1 tablet (5 mg total) by mouth 2 (two) times a day  •  LORazepam (ATIVAN) 0.5 mg tablet; Take 1 tablet (0.5 mg total) by mouth 2 (two) times a day as needed for anxiety    Severe major depressive disorder (HCC)  - Continue Effexor 75 mg daily.  - Continue routine visits with therapist.  - Will complete FMLA paperwork.   - Recommend follow up in 1 month.                 History of Present Illness   Patient presents to office today with concerns regarding increased anxiety and depression. She is currently on Effexor 75 mg daily. She was prescribed 37.5 mg to be taken at bedtime but stated it caused her to have a hard time sleeping. States she already has difficulty sleeping. Tried Trazodone in the past which made her too sleepy. She is currently seeing a therapist once every 3 weeks but plans on increasing the frequency of her visits. She is requesting intermittent FMLA paperwork. Has had to leave work early several times lately due to her anxiety and depression. She denies any other concerns or complaints today.          Review of Systems   Constitutional:  Negative for fatigue and fever.   HENT:  Negative for trouble swallowing.    Eyes:  Negative for visual disturbance.   Respiratory:  Negative for cough and shortness of breath.    Cardiovascular:  Negative for chest pain and palpitations.   Gastrointestinal:  Negative for abdominal pain and blood in stool.   Endocrine:  "Negative for cold intolerance and heat intolerance.   Genitourinary:  Negative for difficulty urinating and dysuria.   Musculoskeletal:  Negative for gait problem.   Skin:  Negative for rash.   Neurological:  Negative for dizziness, syncope and headaches.   Hematological:  Negative for adenopathy.   Psychiatric/Behavioral:  Positive for dysphoric mood and sleep disturbance. Negative for behavioral problems. The patient is nervous/anxious.        Objective   /90 (BP Location: Left arm, Patient Position: Sitting, Cuff Size: Standard)   Pulse (!) 116   Temp 98.8 °F (37.1 °C) (Tympanic)   Resp 16   Ht 4' 10\" (1.473 m)   Wt 64 kg (141 lb)   SpO2 96%   BMI 29.47 kg/m²      Physical Exam  Vitals and nursing note reviewed.   Constitutional:       Appearance: Normal appearance. She is well-developed.   HENT:      Head: Normocephalic and atraumatic.      Right Ear: External ear normal.      Left Ear: External ear normal.   Eyes:      Conjunctiva/sclera: Conjunctivae normal.   Cardiovascular:      Rate and Rhythm: Normal rate and regular rhythm.      Heart sounds: Normal heart sounds.   Pulmonary:      Effort: Pulmonary effort is normal.      Breath sounds: Normal breath sounds.   Musculoskeletal:         General: Normal range of motion.      Cervical back: Normal range of motion.   Skin:     General: Skin is warm and dry.   Neurological:      Mental Status: She is alert and oriented to person, place, and time.   Psychiatric:         Mood and Affect: Affect is tearful.         "

## 2025-04-16 NOTE — ASSESSMENT & PLAN NOTE
- Continue Effexor 75 mg daily.  - Continue routine visits with therapist.  - Will complete FMLA paperwork.   - Recommend follow up in 1 month.

## 2025-04-17 ENCOUNTER — TELEPHONE (OUTPATIENT)
Dept: FAMILY MEDICINE CLINIC | Facility: CLINIC | Age: 35
End: 2025-04-17

## 2025-05-28 ENCOUNTER — OFFICE VISIT (OUTPATIENT)
Dept: FAMILY MEDICINE CLINIC | Facility: CLINIC | Age: 35
End: 2025-05-28
Payer: COMMERCIAL

## 2025-05-28 VITALS
WEIGHT: 140.6 LBS | TEMPERATURE: 100.3 F | DIASTOLIC BLOOD PRESSURE: 78 MMHG | HEART RATE: 92 BPM | SYSTOLIC BLOOD PRESSURE: 116 MMHG | OXYGEN SATURATION: 98 % | RESPIRATION RATE: 16 BRPM | HEIGHT: 58 IN | BODY MASS INDEX: 29.52 KG/M2

## 2025-05-28 DIAGNOSIS — F33.9 RECURRENT DEPRESSION (HCC): ICD-10-CM

## 2025-05-28 DIAGNOSIS — G47.00 INSOMNIA, UNSPECIFIED TYPE: ICD-10-CM

## 2025-05-28 DIAGNOSIS — F41.9 ANXIETY: Primary | ICD-10-CM

## 2025-05-28 PROCEDURE — 99214 OFFICE O/P EST MOD 30 MIN: CPT | Performed by: NURSE PRACTITIONER

## 2025-05-28 RX ORDER — HYDROXYZINE HYDROCHLORIDE 10 MG/1
10 TABLET, FILM COATED ORAL
Qty: 30 TABLET | Refills: 0 | Status: SHIPPED | OUTPATIENT
Start: 2025-05-28

## 2025-05-28 NOTE — ASSESSMENT & PLAN NOTE
- Improving.  - Continue Effexor 75 mg daily and Buspar 5 mg twice daily.  - Continue Ativan 0.5 mg twice daily PRN.  - Continue routine visits with therapist.  - Recommend follow up in 3 months.

## 2025-05-28 NOTE — ASSESSMENT & PLAN NOTE
- Improving.   - Continue Effexor 75 mg daily.   - Continue routine visits with therapist.  - Recommend follow up in 3 months.

## 2025-05-28 NOTE — PROGRESS NOTES
Name: Nayely Garcia      : 1990      MRN: [id number]  Encounter Provider: JAIDA Da Silva  Encounter Date: 2025   Encounter department: [facility]  :  Assessment & Plan  Anxiety  - Improving.  - Continue Effexor 75 mg daily and Buspar 5 mg twice daily.  - Continue Ativan 0.5 mg twice daily PRN.  - Continue routine visits with therapist.  - Recommend follow up in 3 months.        Recurrent depression (HCC)  - Improving.   - Continue Effexor 75 mg daily.   - Continue routine visits with therapist.  - Recommend follow up in 3 months.          Insomnia, unspecified type  - Not well controlled.  - Prescription sent for hydroxyzine 10 mg as needed at bedtime. Discussed side effects.  - Will continue to monitor.   Orders:  •  hydrOXYzine HCL (ATARAX) 10 mg tablet; Take 1 tablet (10 mg total) by mouth daily at bedtime as needed (sleep)           History of Present Illness   Patient presents to office today for 1 month follow up regarding anxiety and depression. Buspar was added to her medication regimen last visit. She feels improvement in her anxiety and depression symptoms. Still having some trouble sleeping. Tried trazodone in the past and it made her too groggy the next day. She denies any other concerns or complaints.           Review of Systems   Constitutional:  Negative for fatigue and fever.   HENT:  Negative for trouble swallowing.    Eyes:  Negative for visual disturbance.   Respiratory:  Negative for cough and shortness of breath.    Cardiovascular:  Negative for chest pain and palpitations.   Gastrointestinal:  Negative for abdominal pain and blood in stool.   Endocrine: Negative for cold intolerance and heat intolerance.   Genitourinary:  Negative for difficulty urinating and dysuria.   Musculoskeletal:  Negative for gait problem.   Skin:  Negative for rash.   Neurological:  Negative for dizziness, syncope and headaches.   Hematological:  Negative for  "adenopathy.   Psychiatric/Behavioral:  Positive for sleep disturbance. Negative for behavioral problems.        Objective   /78 (BP Location: Left arm, Patient Position: Sitting, Cuff Size: Standard)   Pulse 92   Temp 100.3 °F (37.9 °C) (Tympanic)   Resp 16   Ht 4' 10\" (1.473 m)   Wt 63.8 kg (140 lb 9.6 oz)   SpO2 98%   BMI 29.39 kg/m²      Physical Exam  Vitals and nursing note reviewed.   Constitutional:       Appearance: Normal appearance. She is well-developed.   HENT:      Head: Normocephalic and atraumatic.      Right Ear: External ear normal.      Left Ear: External ear normal.     Eyes:      Conjunctiva/sclera: Conjunctivae normal.       Cardiovascular:      Rate and Rhythm: Normal rate and regular rhythm.      Heart sounds: Normal heart sounds.   Pulmonary:      Effort: Pulmonary effort is normal.      Breath sounds: Normal breath sounds.     Musculoskeletal:         General: Normal range of motion.      Cervical back: Normal range of motion.     Skin:     General: Skin is warm and dry.     Neurological:      Mental Status: She is alert and oriented to person, place, and time.     Psychiatric:         Mood and Affect: Mood normal.         Behavior: Behavior normal.         "

## 2025-05-28 NOTE — ASSESSMENT & PLAN NOTE
- Not well controlled.  - Prescription sent for hydroxyzine 10 mg as needed at bedtime. Discussed side effects.  - Will continue to monitor.   Orders:  •  hydrOXYzine HCL (ATARAX) 10 mg tablet; Take 1 tablet (10 mg total) by mouth daily at bedtime as needed (sleep)

## 2025-06-20 DIAGNOSIS — G47.00 INSOMNIA, UNSPECIFIED TYPE: ICD-10-CM

## 2025-06-21 RX ORDER — HYDROXYZINE HYDROCHLORIDE 10 MG/1
10 TABLET, FILM COATED ORAL
Qty: 90 TABLET | Refills: 1 | Status: SHIPPED | OUTPATIENT
Start: 2025-06-21

## 2025-08-01 DIAGNOSIS — F32.2 SEVERE MAJOR DEPRESSIVE DISORDER (HCC): ICD-10-CM

## 2025-08-01 DIAGNOSIS — F41.9 ANXIETY: ICD-10-CM

## 2025-08-01 RX ORDER — VENLAFAXINE HYDROCHLORIDE 75 MG/1
CAPSULE, EXTENDED RELEASE ORAL
Qty: 90 CAPSULE | Refills: 1 | Status: SHIPPED | OUTPATIENT
Start: 2025-08-01